# Patient Record
Sex: FEMALE | Race: WHITE | NOT HISPANIC OR LATINO | Employment: FULL TIME | URBAN - METROPOLITAN AREA
[De-identification: names, ages, dates, MRNs, and addresses within clinical notes are randomized per-mention and may not be internally consistent; named-entity substitution may affect disease eponyms.]

---

## 2018-02-02 DIAGNOSIS — Z01.810 PRE-OPERATIVE CARDIOVASCULAR EXAMINATION: ICD-10-CM

## 2018-02-02 DIAGNOSIS — Z01.812 PRE-OPERATIVE LABORATORY EXAMINATION: ICD-10-CM

## 2018-02-02 LAB
ANION GAP SERPL CALC-SCNC: 10 MMOL/L (ref 0–11.9)
BUN SERPL-MCNC: 12 MG/DL (ref 8–22)
CALCIUM SERPL-MCNC: 9.8 MG/DL (ref 8.5–10.5)
CHLORIDE SERPL-SCNC: 104 MMOL/L (ref 96–112)
CO2 SERPL-SCNC: 22 MMOL/L (ref 20–33)
CREAT SERPL-MCNC: 0.67 MG/DL (ref 0.5–1.4)
EKG IMPRESSION: NORMAL
ERYTHROCYTE [DISTWIDTH] IN BLOOD BY AUTOMATED COUNT: 39.9 FL (ref 35.9–50)
GLUCOSE SERPL-MCNC: 82 MG/DL (ref 65–99)
HCG SERPL QL: NEGATIVE
HCT VFR BLD AUTO: 43.6 % (ref 37–47)
HGB BLD-MCNC: 14.4 G/DL (ref 12–16)
MCH RBC QN AUTO: 30.6 PG (ref 27–33)
MCHC RBC AUTO-ENTMCNC: 33 G/DL (ref 33.6–35)
MCV RBC AUTO: 92.6 FL (ref 81.4–97.8)
PLATELET # BLD AUTO: 266 K/UL (ref 164–446)
PMV BLD AUTO: 11.5 FL (ref 9–12.9)
POTASSIUM SERPL-SCNC: 3.7 MMOL/L (ref 3.6–5.5)
RBC # BLD AUTO: 4.71 M/UL (ref 4.2–5.4)
SODIUM SERPL-SCNC: 136 MMOL/L (ref 135–145)
WBC # BLD AUTO: 9.5 K/UL (ref 4.8–10.8)

## 2018-02-02 PROCEDURE — 36415 COLL VENOUS BLD VENIPUNCTURE: CPT

## 2018-02-02 PROCEDURE — 93005 ELECTROCARDIOGRAM TRACING: CPT

## 2018-02-02 PROCEDURE — 93010 ELECTROCARDIOGRAM REPORT: CPT | Performed by: INTERNAL MEDICINE

## 2018-02-02 PROCEDURE — 85027 COMPLETE CBC AUTOMATED: CPT

## 2018-02-02 PROCEDURE — 80048 BASIC METABOLIC PNL TOTAL CA: CPT

## 2018-02-02 PROCEDURE — 84703 CHORIONIC GONADOTROPIN ASSAY: CPT

## 2018-02-02 RX ORDER — ACETAMINOPHEN 500 MG
500-1000 TABLET ORAL EVERY 6 HOURS PRN
COMMUNITY

## 2018-02-03 ENCOUNTER — HOSPITAL ENCOUNTER (OUTPATIENT)
Facility: MEDICAL CENTER | Age: 38
End: 2018-02-03
Attending: SPECIALIST | Admitting: SPECIALIST
Payer: COMMERCIAL

## 2018-02-03 VITALS
OXYGEN SATURATION: 95 % | WEIGHT: 205.47 LBS | RESPIRATION RATE: 18 BRPM | SYSTOLIC BLOOD PRESSURE: 107 MMHG | BODY MASS INDEX: 31.14 KG/M2 | HEART RATE: 95 BPM | HEIGHT: 68 IN | TEMPERATURE: 99 F | DIASTOLIC BLOOD PRESSURE: 67 MMHG

## 2018-02-03 DIAGNOSIS — G89.18 POST-OP PAIN: ICD-10-CM

## 2018-02-03 PROBLEM — D25.9: Status: ACTIVE | Noted: 2018-02-03

## 2018-02-03 PROBLEM — R10.2 PELVIC PAIN IN FEMALE: Status: ACTIVE | Noted: 2018-02-03

## 2018-02-03 PROBLEM — N94.6 DYSMENORRHEA: Status: ACTIVE | Noted: 2018-02-03

## 2018-02-03 PROCEDURE — 500002 HCHG ADHESIVE, DERMABOND: Performed by: SPECIALIST

## 2018-02-03 PROCEDURE — 500123 HCHG BOVIE, CONTROL W/BLADE: Performed by: SPECIALIST

## 2018-02-03 PROCEDURE — 160029 HCHG SURGERY MINUTES - 1ST 30 MINS LEVEL 4: Performed by: SPECIALIST

## 2018-02-03 PROCEDURE — 500886 HCHG PACK, LAPAROSCOPY: Performed by: SPECIALIST

## 2018-02-03 PROCEDURE — 700111 HCHG RX REV CODE 636 W/ 250 OVERRIDE (IP)

## 2018-02-03 PROCEDURE — 501445 HCHG STAPLER, SKIN DISP: Performed by: SPECIALIST

## 2018-02-03 PROCEDURE — 88305 TISSUE EXAM BY PATHOLOGIST: CPT

## 2018-02-03 PROCEDURE — A9270 NON-COVERED ITEM OR SERVICE: HCPCS

## 2018-02-03 PROCEDURE — 160046 HCHG PACU - 1ST 60 MINS PHASE II: Performed by: SPECIALIST

## 2018-02-03 PROCEDURE — 700102 HCHG RX REV CODE 250 W/ 637 OVERRIDE(OP)

## 2018-02-03 PROCEDURE — 160041 HCHG SURGERY MINUTES - EA ADDL 1 MIN LEVEL 4: Performed by: SPECIALIST

## 2018-02-03 PROCEDURE — 501399 HCHG SPECIMAN BAG, ENDO CATC: Performed by: SPECIALIST

## 2018-02-03 PROCEDURE — 700101 HCHG RX REV CODE 250

## 2018-02-03 PROCEDURE — 500800 HCHG LAPAROSCOPIC J/L HOOK: Performed by: SPECIALIST

## 2018-02-03 PROCEDURE — 160035 HCHG PACU - 1ST 60 MINS PHASE I: Performed by: SPECIALIST

## 2018-02-03 PROCEDURE — 160025 RECOVERY II MINUTES (STATS): Performed by: SPECIALIST

## 2018-02-03 PROCEDURE — 160048 HCHG OR STATISTICAL LEVEL 1-5: Performed by: SPECIALIST

## 2018-02-03 PROCEDURE — 160002 HCHG RECOVERY MINUTES (STAT): Performed by: SPECIALIST

## 2018-02-03 PROCEDURE — 501579 HCHG TROCAR, STEP 5MM: Performed by: SPECIALIST

## 2018-02-03 PROCEDURE — 501570 HCHG TROCAR, SEPARATOR: Performed by: SPECIALIST

## 2018-02-03 PROCEDURE — 501838 HCHG SUTURE GENERAL: Performed by: SPECIALIST

## 2018-02-03 PROCEDURE — 502240 HCHG MISC OR SUPPLY RC 0272: Performed by: SPECIALIST

## 2018-02-03 PROCEDURE — 500854 HCHG NEEDLE, INSUFFLATION FOR STEP: Performed by: SPECIALIST

## 2018-02-03 PROCEDURE — 501577 HCHG TROCAR, STEP 11MM: Performed by: SPECIALIST

## 2018-02-03 PROCEDURE — 160009 HCHG ANES TIME/MIN: Performed by: SPECIALIST

## 2018-02-03 RX ORDER — MAGNESIUM HYDROXIDE 1200 MG/15ML
LIQUID ORAL
Status: DISCONTINUED | OUTPATIENT
Start: 2018-02-03 | End: 2018-02-03 | Stop reason: HOSPADM

## 2018-02-03 RX ORDER — HYDROCODONE BITARTRATE AND ACETAMINOPHEN 5; 325 MG/1; MG/1
1 TABLET ORAL EVERY 6 HOURS PRN
Status: DISCONTINUED | OUTPATIENT
Start: 2018-02-03 | End: 2018-02-03 | Stop reason: HOSPADM

## 2018-02-03 RX ORDER — OXYCODONE HCL 5 MG/5 ML
SOLUTION, ORAL ORAL
Status: COMPLETED
Start: 2018-02-03 | End: 2018-02-03

## 2018-02-03 RX ORDER — MEPERIDINE HYDROCHLORIDE 25 MG/ML
INJECTION INTRAMUSCULAR; INTRAVENOUS; SUBCUTANEOUS
Status: COMPLETED
Start: 2018-02-03 | End: 2018-02-03

## 2018-02-03 RX ORDER — ONDANSETRON 2 MG/ML
4 INJECTION INTRAMUSCULAR; INTRAVENOUS EVERY 6 HOURS PRN
Status: DISCONTINUED | OUTPATIENT
Start: 2018-02-03 | End: 2018-02-03 | Stop reason: HOSPADM

## 2018-02-03 RX ORDER — SODIUM CHLORIDE, SODIUM LACTATE, POTASSIUM CHLORIDE, CALCIUM CHLORIDE 600; 310; 30; 20 MG/100ML; MG/100ML; MG/100ML; MG/100ML
INJECTION, SOLUTION INTRAVENOUS CONTINUOUS
Status: DISCONTINUED | OUTPATIENT
Start: 2018-02-03 | End: 2018-02-03 | Stop reason: HOSPADM

## 2018-02-03 RX ORDER — HYDROCODONE BITARTRATE AND ACETAMINOPHEN 5; 325 MG/1; MG/1
1 TABLET ORAL EVERY 6 HOURS PRN
Qty: 28 TAB | Refills: 0 | Status: SHIPPED | OUTPATIENT
Start: 2018-02-03 | End: 2018-02-17

## 2018-02-03 RX ORDER — ONDANSETRON 4 MG/1
4 TABLET, ORALLY DISINTEGRATING ORAL EVERY 4 HOURS PRN
Qty: 30 TAB | Refills: 0 | Status: SHIPPED | OUTPATIENT
Start: 2018-02-03 | End: 2021-02-18

## 2018-02-03 RX ORDER — IBUPROFEN 800 MG/1
800 TABLET ORAL EVERY 8 HOURS PRN
Qty: 30 TAB | Refills: 3 | Status: SHIPPED | OUTPATIENT
Start: 2018-02-03

## 2018-02-03 RX ORDER — IBUPROFEN 200 MG
800 TABLET ORAL EVERY 8 HOURS PRN
Status: DISCONTINUED | OUTPATIENT
Start: 2018-02-03 | End: 2018-02-03 | Stop reason: HOSPADM

## 2018-02-03 RX ORDER — LIDOCAINE HYDROCHLORIDE 10 MG/ML
INJECTION, SOLUTION INFILTRATION; PERINEURAL
Status: COMPLETED
Start: 2018-02-03 | End: 2018-02-03

## 2018-02-03 RX ADMIN — MEPERIDINE HYDROCHLORIDE 25 MG: 25 INJECTION INTRAMUSCULAR; INTRAVENOUS; SUBCUTANEOUS at 09:35

## 2018-02-03 RX ADMIN — LIDOCAINE HYDROCHLORIDE 0.5 ML: 10 INJECTION, SOLUTION INFILTRATION; PERINEURAL at 06:34

## 2018-02-03 RX ADMIN — OXYCODONE HYDROCHLORIDE 10 MG: 5 SOLUTION ORAL at 09:35

## 2018-02-03 ASSESSMENT — PAIN SCALES - GENERAL
PAINLEVEL_OUTOF10: 0
PAINLEVEL_OUTOF10: 6
PAINLEVEL_OUTOF10: 6
PAINLEVEL_OUTOF10: 3
PAINLEVEL_OUTOF10: 7

## 2018-02-03 NOTE — OP REPORT
DATE OF SERVICE:  02/03/2018    PREOPERATIVE DIAGNOSES:  1.  Pelvic pain.  2.  Menorrhagia.  3.  Dysmenorrhea.  4.  Posterior uterine fibroid.    POSTOPERATIVE DIAGNOSES:  1.  Pelvic pain.  2.  Menorrhagia.  3.  Dysmenorrhea.  4.  Posterior uterine fibroid.  5.  There are extensive adhesions of the omentum to the anterior abdominal   wall underneath the umbilicus and also to the anterior abdominal wall in the   right lower quadrant.  6.  There are also adhesions involving both ovaries, including adhesions of   the left ovary to the left posterior aspect of the uterus as well as other   adhesions of the left ovary to surrounding tissues and there are also   adhesions of the right ovary to the right posterior aspect of the uterus and   also adhesions of the right ovary to the right ovarian fossa.    PROCEDURES:  1.  Laparoscopy with lysis of adhesions of the omentum to the anterior   abdominal wall.  2.  Laparoscopic of bilateral periovarian adhesions.  3.  Myomectomy (removal of posterior uterine fibroid).    SURGEON:  Marek Daugherty MD    ANESTHESIA:  General endotracheal tube anesthesia.    ANESTHESIOLOGIST:  Mateo Calvin MD    FINDINGS:  1.  Speculum exam under anesthesia reveals that there are no vulvar, vaginal   or cervical lesions.  The cervix was well visualized and found to be   nulliparous.  2.  At laparoscopy, extensive adhesions of the omentum to the anterior   abdominal wall are encountered and also, there are adhesions of the omentum to   the anterior abdominal wall in the right lower quadrant as well as adhesions   to the anterior abdominal wall underneath the umbilicus.  3.  There are bilateral adnexal (ovarian) adhesions.  These include adhesions   of the left ovary to the left posterior aspect of the uterus and also   adhesions of the left ovary to surrounding tissues/structures.  4.  There are adhesions involving the right ovary including adhesions of the   right ovary to the right posterior  aspect of the uterus and also adhesions of   the right ovary to the right ovarian fossa.  There is a pedunculated   subserosal midline posterior uterine fibroid.  5.  Both fallopian tubes are absent.  6.  At the end of the procedure, hemostasis was noted to be adequate.    SPECIMENS:  Posterior uterine fibroid.    COMPLICATIONS:  None.    ESTIMATED BLOOD LOSS:  Less than 50 mL    DESCRIPTION OF PROCEDURE:  After the appropriate consents have been obtained,   the patient was taken to the operating room and given general anesthesia.  She   is prepped and draped in the dorsal lithotomy position and a Wakefield catheter   is noted to be in place and draining urine.  A speculum exam is performed and   reveals that there are no vulvar, vaginal or cervical lesions.  The cervix is   well visualized and is nulliparous.  The anterior aspect of the cervix is   grasped with a single tooth tenaculum.  The cervix is dilated with Hanks   dilators.  The uterus is sounded to 7 cm.  A 6 cm Ana uterine manipulator is   advanced through the endocervical canal into the intrauterine cavity and the   balloon at the tip of the Ana uterine manipulator is inflated with 5 mL of   air.  The single tooth tenaculum was removed from the cervix.  A gauze sponge   was placed in the vaginal vault posterior to the Ana uterine manipulator and   left in place as the speculum was removed.  's gloves were then   changed.  Attention was then directed to the abdomen where a small   (approximately 1 cm) vertical infraumbilical incision made with a scalpel.  A   Veress needle was advanced through this incision into the peritoneal cavity   and proper placement in the peritoneal cavity is verified with a Diamond   hanging drop technique.  The peritoneal cavity is then insufflated with   approximately 2-3 liters of carbon dioxide gas.  The Veress needle was removed   and a 5 mm port was introduced through the infraumbilical incision into the   peritoneal  cavity utilizing the VersaStep trocar system.  The central portion   of this port was removed and a 5 mm 0-degree laparoscope was inserted through   the remaining sleeve and proper entry in the peritoneal cavity was verified   visually with laparoscope.  The patient was placed in Trendelenburg position.    A 5 mm port was placed suprapubically under direct laparoscopic   visualization, utilizing the VersaStep trocar system.  The laparoscope was   placed through the suprapubic port and used to visualize the upper abdomen   including the infraumbilical port and when this was done, extensive adhesions   of the omentum to the anterior abdominal wall underneath the umbilicus are   seen.  A 5 mm port was placed in the left lower quadrant under direct   laparoscopic visualization utilizing the VersaStep trocar system and the   suprapubic port was converted to a 10-12 mm port again utilizing the VersaStep   trocar system.  The laparoscope was placed not only through the   infraumbilical port, but at times through the 2 other accessory ports.  Also,   the 5 mm 0-degree laparoscope was exchanged for the 5 mm 30-degree   laparoscope.  The laparoscope was placed through the accessory ports as is the   Harmonic scalpel in order to lyse adhesions of the omentum to the anterior   abdominal wall.  This takes considerable time, but once accomplished, the   omentum was thoroughly dissected away from the anterior abdominal wall.  The   laparoscope was replaced through the infraumbilical port.  The uterus is   mobilized with the Ana uterine manipulator and findings are as noted above.    Adhesiolysis is carried out involving both ovaries with the Harmonic scalpel   in the usual fashion.  The posterior uterine fibroids are resected with the   Harmonic scalpel in the usual fashion.  The EndoCatch bag was placed through   the suprapubic port and opened in the peritoneal cavity and resected.    Posterior fibroid was placed in the  EndoCatch bag and the mouth of the   EndoCatch bag was closed over this and the mouth of the EndoCatch bag along   with suprapubic port were delivered through the suprapubic incision.  The   suprapubic skin incision was extended bilaterally with the scalpel and layers   underneath the suprapubic incision were dissected out and extended slightly   and the bag was removed with the fibroid.  A 15 mm port was placed   suprapubically.  The pelvis was copiously irrigated and drained.  There is   some bleeding seen from inferior aspect of the right ovary from where it had   been adherent to the right ovarian fossa and the spleen was cauterized with   monopolar cautery instrument, namely the J hook.  The pelvis was copiously   irrigated and drained with the suction irrigation instrument.  Hemostasis is   noted to be excellent.  At times, a gauze sponge was placed in the peritoneal   cavity and then removed.  At this time, lap and needle counts reported to be   correct.  1.5 L of ADEPT solution (adhesion prevention solution) placed in the   peritoneal cavity, so as to discourage reformation of adhesions.  The   laparoscope was removed and air is allowed to evacuate the peritoneal cavity.    All ports were removed.  The fascia underneath the suprapubic incision is   identified with the use of S retractors and reapproximated with placement of   simple interrupted suture using Vicryl.  Skin incision reapproximated with   interrupted buried sutures of 4-0 Monocryl and Vicryl.  The Ana uterine   manipulator and vaginal gauze sponge were removed.  The procedure was   terminated.  Final lap and needle counts reported to be correct x2 at the end   of the procedure.  The patient tolerated the procedure well and sent to   postanesthesia recovery in stable condition.       ____________________________________     MD THA MARCOS / N[facility]    DD:  02/03/2018 10:07:00  DT:  02/03/2018 10:39:05    D#:  [id number]  Job#:   [id number]    cc: CONG YUNG MD

## 2018-02-03 NOTE — OR SURGEON
Immediate Post OP Note    PreOp Diagnosis:   Pelvic pain  Menorrhagia  Dysmenorrhea  Posterior uterine fibroid.    PostOp Diagnosis:   Pelvic pain  Menorrhagia  Dysmenorrhea  Posterior uterine fibroid.  There are extensive adhesions of the omentum to the anterior abdominal wall underneath the umbilicus and also to the anterior wall in the right lower quadrant of the abdomen.  There are also adhesions involving both ovaries, including adhesion of the left ovary to the left posterior aspect of the uterus as well as other adhesions of the left ovary to surrounding tissues and also there are adhesions of the right ovary to the right posterior aspect of the uterus and also to the right ovarian fossa.    Procedure(s):  PELVISCOPY - LYSIS OF ADHESIONS - Wound Class: Clean  MYOMECTOMY - Wound Class: Clean    Surgeon(s):  Marek Daugherty M.D.    Anesthesiologist/Type of Anesthesia:  Anesthesiologist: Mateo Calvin M.D./General endotracheal tube anesthesia    Surgical Staff:  Circulator: Sheri Shrestha R.N.; Claudia Muniz RJULIAN; Alexis Castañeda RJudyNJudy  Scrub Person: Pop Milligangins    Specimens:  Posterior uterine fibroid    Estimated Blood Loss:  Less than 50 mL    Findings:  Speculum exam under anesthesia reveals that there are no vulvar or vaginal or cervical lesions. Cervix well visualized and nulliparous.  At laparoscopy extensive adhesions of the omentum to the anterior abdominal wall and also to the abdominal wall in the right lower quadrant are noted. These are lysed.  Also there are bilateral adnexal (ovarian) adhesions. These included adhesions of the left ovary to the left posterior aspect of the uterus and also adhesions of the left ovary to surrounding tissue/structures.  There are adhesions involving the right ovary including adhesions of the right ovary to the right posterior aspect of the uterus and adhesions of the right ovary to the right ovarian fossa.  There is a pedunculated subserosal midline  posterior uterine fibroid.  Of note both fallopian tubes are absent.  At the end of the procedure hemostasis is noted to be adequate.    Complications:  None    Of note 3 small laparoscopic incisions were made. These include a small infraumbilical vertical incision, a somewhat larger (approximately 2 cm) horizontal suprapubic incision, and a small (proxy 1 cm) left lower quadrant incision.        2/3/2018 9:39 AM Marek Daugherty

## 2018-02-03 NOTE — H&P
Mira Diaz          YOB: 1980  Date of today's surgery: Saturday, February 3, 2018  Facility: [facility]    ID: The patient is a very pleasant 37-year-old nullipara.    Chief Complaint: The patient complains of pelvic pain, including dysmenorrhea, which is accompanied by menorrhagia.    History of Present Illness: The patient has had complaints of pelvic pain and menorrhagia and dysmenorrhea and has been diagnosed as having a uterine fibroid.  On August 29, 2016 I performed a transvaginal pelvic ultrasound (indication: pelvic pain and dysmenorrhea) which did reveal a posterior subserosal uterine fibroids measuring 22.4 millimeters by 21.5 millimeters by 20.8 millimeters and revealed that the uterus measures 5.47 centimeters longitudinally, not including the cervix, by 4.31 centimeters in AP dimension, by 5.08 centimeters in transverse dimension and the endometrial stripe was smooth and regular and measured 9.66 millimeters in thickness and both ovaries were identified and were normal.  Her recent Pap smear from September 13, 2017 was normal/negative.  She is scheduled today for a laparoscopy, both diagnostic and if necessary operative with possible laparoscopic myomectomy.  I have discussed with the patient in detail and at length what diagnostic and operative laparoscopy, including laparoscopic myomectomy, is, and what diagnostic and operative laparoscopy, along with laparoscopic myomectomy, involves, and I discussed with her and explained to her in detail and at length the risks and benefits and alternatives of diagnostic and operative laparoscopy with laparoscopic myomectomy.  After our discussions and after answering her question she said that she would like for us to proceed with laparoscopy, both diagnostic and if necessary and depending on findings at the time of laparoscopy, operative, with possible laparoscopic myomectomy.    Past Medical History: The patient  says that she has a history of migraine headaches and low back pain and asthma.    Past Surgical History: The patient says that she has had an appendectomy and tonsillectomy and salpingectomy.  It was on April 18, 2015, almost 3 years ago, that I performed diagnostic and operative laparoscopy with extensive laparoscopic lysis of adhesions and laparoscopic removal of bilateral hydrosalpinx via laparoscopic bilateral salpingectomies.    Medications: The patient currently takes no medications.    Allergies: The patient says that she is allergic to penicillin.    Social History: The patient denies smoking.  She only really consumes alcoholic beverages.  She denies the use of recreational drugs.    Review of Systems  General: The patient denies any fevers, chills, sweats.  She does have some fatigue.  Pulmonary: The patient denies any coughing, wheezing, chest pain, shortness of breath.  Cardiovascular: The patient denies any palpitations, dyspnea, chest pain.  Gastrointestinal: The patient denies any nausea except when she has nausea with pelvic pain, and she says that her pelvic pain is accompanied by nausea), vomiting, hematochezia, melena, history of hepatitis, history of jaundice.  She says she really has constipation and that she really has diarrhea.  Genitourinary: The patient complains of menorrhagia and dysmenorrhea and pelvic pain.  Musculoskeletal: The patient denies any arthralgias or myalgias.   Neurological: No headaches or syncope or seizures.     Physical Exam:   Vital Signs: The patient's vital signs are stable and she is afebrile.  General: The patient appears well developed and well nourished and relaxed and alert and comfortable and in no apparent distress.    HEENT :  Normo-cephalic, atraumatic, pupils equal, round, reactive to light and accommodation, extra ocular motions intact, pharynx clear; there is no thyromegaly. There is no cervical lymphadenopathy.  Chest: Heart regular rate and rhythm, with  no murmurs or rubs or gallops; the lungs are clear to auscultation bilaterally.  Abdomen: The abdomen is soft and flat and non-tender and non-distended. There is no hepatomegaly. There is no splenomegaly.   Pelvic: Bimanual exam reveals that there is diffuse mild nonspecific pelvic tenderness and reveals no evidence of uterine enlargement and no evidence of any adnexal masses either on the right or the left.  Extremities: No clubbing or cyanosis or edema.   Neurological: non-focal.     Assessment:   Pelvic pain  Menorrhagia  Dysmenorrhea  Posterior uterine fibroid.    Plan:   We will proceed today with laparoscopy, both diagnostic and if necessary and depending on findings at the time of laparoscopy, operative, with possible laparoscopic myomectomy.  Please see above.            ________________________  Marek Daugherty M.D.

## 2018-02-03 NOTE — DISCHARGE INSTRUCTIONS
ACTIVITY: Rest and take it easy for the first 24 hours.  A responsible adult is recommended to remain with you during that time.  It is normal to feel sleepy.  We encourage you to not do anything that requires balance, judgment or coordination.    MILD FLU-LIKE SYMPTOMS ARE NORMAL. YOU MAY EXPERIENCE GENERALIZED MUSCLE ACHES, THROAT IRRITATION, HEADACHE AND/OR SOME NAUSEA.    FOR 24 HOURS DO NOT:  Drive, operate machinery or run household appliances.  Drink beer or alcoholic beverages.   Make important decisions or sign legal documents.    SPECIAL INSTRUCTIONS: *Follow up with Dr Daugherty in about 2 weeks  Follow Dr Daugherty instructions   Nothing in your vagina until Okayed by Dr Daugherty**    DIET: To avoid nausea, slowly advance diet as tolerated, avoiding spicy or greasy foods for the first day.  Add more substantial food to your diet according to your physician's instructions.  Babies can be fed formula or breast milk as soon as they are hungry.  INCREASE FLUIDS AND FIBER TO AVOID CONSTIPATION.    SURGICAL DRESSING/BATHING: *Ok to Shower, no lotions or creams on sites. Keep Clean and dry. *    FOLLOW-UP APPOINTMENT:  A follow-up appointment should be arranged with your doctor in *2 Weeks**; call to schedule.    You should CALL YOUR PHYSICIAN if you develop:  Fever greater than 101 degrees F.  Pain not relieved by medication, or persistent nausea or vomiting.  Excessive bleeding (blood soaking through dressing) or unexpected drainage from the wound.  Extreme redness or swelling around the incision site, drainage of pus or foul smelling drainage.  Inability to urinate or empty your bladder within 8 hours.  Problems with breathing or chest pain.    You should call 911 if you develop problems with breathing or chest pain.  If you are unable to contact your doctor or surgical center, you should go to the nearest emergency room or urgent care center.  Physician's telephone #: *[phone]**    If any questions arise,  call your doctor.  If your doctor is not available, please feel free to call the Surgical Center at [phone].  The Center is open Monday through Friday from 7AM to 7PM.  You can also call the HEALTH HOTLINE open 24 hours/day, 7 days/week and speak to a nurse at [phone], or toll free at [phone].    A registered nurse may call you a few days after your surgery to see how you are doing after your procedure.    MEDICATIONS: Resume taking daily medication.  Take prescribed pain medication with food.  If no medication is prescribed, you may take non-aspirin pain medication if needed.  PAIN MEDICATION CAN BE VERY CONSTIPATING.  Take a stool softener or laxative such as senokot, pericolace, or milk of magnesia if needed.    Prescription given for *Norco and Motrin**.  Last pain medication given at *9:35 am **.    If your physician has prescribed pain medication that includes Acetaminophen (Tylenol), do not take additional Acetaminophen (Tylenol) while taking the prescribed medication.    Depression / Suicide Risk    As you are discharged from this [facility] facility, it is important to learn how to keep safe from harming yourself.    Recognize the warning signs:  · Abrupt changes in personality, positive or negative- including increase in energy   · Giving away possessions  · Change in eating patterns- significant weight changes-  positive or negative  · Change in sleeping patterns- unable to sleep or sleeping all the time   · Unwillingness or inability to communicate  · Depression  · Unusual sadness, discouragement and loneliness  · Talk of wanting to die  · Neglect of personal appearance   · Rebelliousness- reckless behavior  · Withdrawal from people/activities they love  · Confusion- inability to concentrate     If you or a loved one observes any of these behaviors or has concerns about self-harm, here's what you can do:  · Talk about it- your feelings and reasons for harming yourself  · Remove any  means that you might use to hurt yourself (examples: pills, rope, extension cords, firearm)  · Get professional help from the community (Mental Health, Substance Abuse, psychological counseling)  · Do not be alone:Call your Safe Contact- someone whom you trust who will be there for you.  · Call your local CRISIS HOTLINE 140-5439 or 646-354-1634  · Call your local Children's Mobile Crisis Response Team [place] (303) 210-3493 or www.[employer]  · Call the toll free National Suicide Prevention Hotlines   · National Suicide Prevention Lifeline 768-932-GTGL (0965)  · National Hope Line Network 800-SUICIDE (069-9439)

## 2018-12-31 ENCOUNTER — HOSPITAL ENCOUNTER (OUTPATIENT)
Facility: MEDICAL CENTER | Age: 38
End: 2018-12-31
Attending: NURSE PRACTITIONER
Payer: COMMERCIAL

## 2018-12-31 ENCOUNTER — OFFICE VISIT (OUTPATIENT)
Dept: URGENT CARE | Facility: MEDICAL CENTER | Age: 38
End: 2018-12-31
Payer: COMMERCIAL

## 2018-12-31 VITALS
DIASTOLIC BLOOD PRESSURE: 68 MMHG | HEART RATE: 74 BPM | BODY MASS INDEX: 31.07 KG/M2 | OXYGEN SATURATION: 100 % | WEIGHT: 205 LBS | HEIGHT: 68 IN | TEMPERATURE: 98 F | SYSTOLIC BLOOD PRESSURE: 108 MMHG

## 2018-12-31 DIAGNOSIS — Z87.19 HISTORY OF DIVERTICULITIS: Primary | ICD-10-CM

## 2018-12-31 DIAGNOSIS — R10.32 LEFT LOWER QUADRANT PAIN: ICD-10-CM

## 2018-12-31 DIAGNOSIS — Z20.818 EXPOSURE TO MRSA: ICD-10-CM

## 2018-12-31 PROCEDURE — 99214 OFFICE O/P EST MOD 30 MIN: CPT | Performed by: NURSE PRACTITIONER

## 2018-12-31 PROCEDURE — 87641 MR-STAPH DNA AMP PROBE: CPT

## 2018-12-31 RX ORDER — AMOXICILLIN AND CLAVULANATE POTASSIUM 875; 125 MG/1; MG/1
1 TABLET, FILM COATED ORAL 3 TIMES DAILY
Qty: 30 TAB | Refills: 0 | Status: SHIPPED | OUTPATIENT
Start: 2018-12-31 | End: 2019-01-10

## 2019-01-01 LAB
MRSA DNA SPEC QL NAA+PROBE: NORMAL
SIGNIFICANT IND 70042: NORMAL
SITE SITE: NORMAL
SOURCE SOURCE: NORMAL

## 2019-01-01 ASSESSMENT — ENCOUNTER SYMPTOMS
WEAKNESS: 0
CONSTITUTIONAL NEGATIVE: 1
ABDOMINAL PAIN: 1
NEUROLOGICAL NEGATIVE: 1
FEVER: 0
EYES NEGATIVE: 1
CONSTIPATION: 0
RESPIRATORY NEGATIVE: 1
FOCAL WEAKNESS: 0
DIZZINESS: 0
SHORTNESS OF BREATH: 0
PSYCHIATRIC NEGATIVE: 1
DIARRHEA: 0
TINGLING: 0
MUSCULOSKELETAL NEGATIVE: 1
SENSORY CHANGE: 0
ANOREXIA: 0
VOMITING: 0
NAUSEA: 0
CARDIOVASCULAR NEGATIVE: 1
BLOOD IN STOOL: 0

## 2019-01-01 NOTE — PROGRESS NOTES
Subjective:     Mira Diaz is a 38 y.o. female who presents for Diverticulitis (diverticulitis flare up, bad pain in left side, tender to the touch, started last night)       LLQ Pain   This is a new problem. The current episode started yesterday. The pain is located in the LLQ. The pain is moderate. The quality of the pain is sharp. The abdominal pain does not radiate. Pertinent negatives include no anorexia, constipation, diarrhea, dysuria, fever, nausea or vomiting. The pain is aggravated by palpation. Hx of diverticulitis.     PMH:  has a past medical history of Arrhythmia; Asthma; Breath shortness; Gynecological disorder; Heart burn; cold sores; Infectious disease; Jaundice (1982); Pain; and Psychiatric disorder.    MEDS:   Current Outpatient Prescriptions:   •  amoxicillin-clavulanate (AUGMENTIN) 875-125 MG Tab, Take 1 Tab by mouth 3 times a day for 10 days., Disp: 30 Tab, Rfl: 0  •  omeprazole (PRILOSEC) 20 MG delayed-release capsule, Take 20 mg by mouth every day., Disp: , Rfl:   •  acyclovir (ZOVIRAX) 200 MG CAPS, Take 800 mg by mouth 2 times a day., Disp: , Rfl:   •  ibuprofen (MOTRIN) 800 MG Tab, Take 1 Tab by mouth every 8 hours as needed for Mild Pain ((1-3 Pain Scale) or fever over 101.5 or if allergic to acetaminophen)., Disp: 30 Tab, Rfl: 3  •  ondansetron (ZOFRAN ODT) 4 MG TABLET DISPERSIBLE, Take 1 Tab by mouth every four hours as needed for Nausea., Disp: 30 Tab, Rfl: 0  •  acetaminophen (TYLENOL) 500 MG Tab, Take 500-1,000 mg by mouth every 6 hours as needed., Disp: , Rfl:   •  NON SPECIFIED, Kratom tea extract for pain anxiety, Disp: , Rfl:   •  alprazolam (XANAX) 0.25 MG TABS, Take 0.25 mg by mouth at bedtime as needed., Disp: , Rfl:     ALLERGIES:   Allergies   Allergen Reactions   • Tape      SURGHX:   Past Surgical History:   Procedure Laterality Date   • PELVISCOPY  2/3/2018    Procedure: PELVISCOPY - LYSIS OF ADHESIONS;  Surgeon: Marek Daugherty M.D.;  Location: SURGERY [facility]  "[facility];  Service: Gynecology   • MYOMECTOMY  2/3/2018    Procedure: MYOMECTOMY;  Surgeon: Marek Daugherty M.D.;  Location: SURGERY [facility];  Service: Gynecology   • LAPAROSCOPY  4/18/2015    Performed by Marek Daugherty M.D. at SURGERY [facility]   • TONSILLECTOMY  1986   • APPENDECTOMY     • TUBAL LIGATION       SOCHX:  reports that she quit smoking about 2 years ago. Her smoking use included Cigarettes. She has never used smokeless tobacco. She reports that she drinks alcohol. She reports that she uses drugs, including Oral.    FH: Reviewed with patient, not pertinent to this visit.     Review of Systems   Constitutional: Negative.  Negative for fever and malaise/fatigue.   HENT: Negative.    Eyes: Negative.    Respiratory: Negative.  Negative for shortness of breath.    Cardiovascular: Negative.  Negative for chest pain.   Gastrointestinal: Positive for abdominal pain (LLQ). Negative for anorexia, blood in stool, constipation, diarrhea, nausea and vomiting.   Genitourinary: Negative.  Negative for dysuria.   Musculoskeletal: Negative.    Skin: Negative.    Neurological: Negative.  Negative for dizziness, tingling, sensory change, focal weakness and weakness.   Psychiatric/Behavioral: Negative.    All other systems reviewed and are negative.    Objective:     /68 (BP Location: Left arm, Patient Position: Sitting, BP Cuff Size: Adult)   Pulse 74   Temp 36.7 °C (98 °F) (Temporal)   Ht 1.727 m (5' 8\")   Wt 93 kg (205 lb)   SpO2 100%   BMI 31.17 kg/m²     Physical Exam   Constitutional: She is oriented to person, place, and time. Vital signs are normal. She appears well-developed and well-nourished. She is cooperative.  Non-toxic appearance. She does not appear ill. No distress.   HENT:   Head: Normocephalic.   Right Ear: External ear normal.   Left Ear: External ear normal.   Nose: Nose normal.   Eyes: Conjunctivae and EOM are normal.   Neck: Normal range of motion.   Cardiovascular: " Normal rate, regular rhythm, normal heart sounds and normal pulses.    Pulmonary/Chest: Effort normal and breath sounds normal. No respiratory distress.   Abdominal: Soft. Normal appearance and bowel sounds are normal. She exhibits no distension. There is tenderness in the left lower quadrant. There is no rebound.   Musculoskeletal: Normal range of motion. She exhibits no deformity.   Neurological: She is alert and oriented to person, place, and time. She has normal strength. No sensory deficit.   Skin: Skin is warm and dry. Capillary refill takes less than 2 seconds.   Psychiatric: She has a normal mood and affect.   Vitals reviewed.       Assessment/Plan:     1. History of diverticulitis  - amoxicillin-clavulanate (AUGMENTIN) 875-125 MG Tab; Take 1 Tab by mouth 3 times a day for 10 days.  Dispense: 30 Tab; Refill: 0    2. Left lower quadrant pain  - amoxicillin-clavulanate (AUGMENTIN) 875-125 MG Tab; Take 1 Tab by mouth 3 times a day for 10 days.  Dispense: 30 Tab; Refill: 0    3. Exposure to MRSA  - MRSA SURVEILLANCE; Future    Pt states she has a GI specialist has previously placed her on Bactrim DS after patient experienced intolerance to ciprofloxacin + metronidazole. Unclear if patient is intolerant to metronidazole alone. Will send Rx for Augmentin. Outpatient CT abdomen not feasible at this time as it is late in the day and a holiday occurs tomorrow. Pt appears non-toxic, is tolerating PO, VS stable so will hold off on immediately referring to ER at this time but advised strict ER precautions with patient who agrees to seek immediate care if symptoms worsen including worsening abdominal pain, fever, etc. Pt states she will follow up with her GI.    On a side note, pt states her son has been in the hospital recently and diagnosed with MRSA. Pt requesting MRSA swab of her nose to determine if she is a carrier or not.    Patient advised to: Return for 1) Symptoms that change or worsen, or go to the ER, 2)  Follow up with primary care in 7-10 days.    Differential diagnosis, natural history, supportive care, and indications for immediate follow-up discussed. All questions answered. Patient agrees with the plan of care.

## 2020-04-15 ENCOUNTER — PATIENT MESSAGE (OUTPATIENT)
Dept: PEDIATRICS | Facility: MEDICAL CENTER | Age: 40
End: 2020-04-15

## 2020-04-16 NOTE — TELEPHONE ENCOUNTER
From: Mira Diaz  To: NARGIS Martinez  Sent: 4/15/2020 4:33 PM PDT  Subject: Non-Urgent Medical Question    Hi Bhavana,    I think that this is psoriasis, there is also a patch developing on his back :(. We have limited baths, included oatmeal when he does bathe, scent free soap and lotion and I have even tried putting vaseline on it. It first showed up about a month ago as a small rough patch and has just continued to grow. Help.

## 2021-01-11 ENCOUNTER — TELEPHONE (OUTPATIENT)
Dept: SCHEDULING | Facility: IMAGING CENTER | Age: 41
End: 2021-01-11

## 2021-02-18 ENCOUNTER — TELEMEDICINE (OUTPATIENT)
Dept: MEDICAL GROUP | Facility: PHYSICIAN GROUP | Age: 41
End: 2021-02-18
Payer: COMMERCIAL

## 2021-02-18 ENCOUNTER — APPOINTMENT (OUTPATIENT)
Dept: MEDICAL GROUP | Facility: PHYSICIAN GROUP | Age: 41
End: 2021-02-18
Payer: COMMERCIAL

## 2021-02-18 VITALS
HEART RATE: 94 BPM | OXYGEN SATURATION: 94 % | BODY MASS INDEX: 29.7 KG/M2 | WEIGHT: 196 LBS | HEIGHT: 68 IN | TEMPERATURE: 97.5 F

## 2021-02-18 DIAGNOSIS — B00.9 HSV (HERPES SIMPLEX VIRUS) INFECTION: ICD-10-CM

## 2021-02-18 DIAGNOSIS — R82.90 CLOUDY URINE: ICD-10-CM

## 2021-02-18 DIAGNOSIS — R35.0 URINARY FREQUENCY: ICD-10-CM

## 2021-02-18 DIAGNOSIS — Z13.21 ENCOUNTER FOR VITAMIN DEFICIENCY SCREENING: ICD-10-CM

## 2021-02-18 DIAGNOSIS — M25.562 ACUTE PAIN OF LEFT KNEE: ICD-10-CM

## 2021-02-18 DIAGNOSIS — E65 CENTRAL OBESITY: ICD-10-CM

## 2021-02-18 DIAGNOSIS — E66.3 OVERWEIGHT (BMI 25.0-29.9): ICD-10-CM

## 2021-02-18 DIAGNOSIS — Z00.00 WELLNESS EXAMINATION: ICD-10-CM

## 2021-02-18 PROCEDURE — 99214 OFFICE O/P EST MOD 30 MIN: CPT | Performed by: PHYSICIAN ASSISTANT

## 2021-02-18 RX ORDER — ACYCLOVIR 800 MG/1
800 TABLET ORAL
COMMUNITY
Start: 2020-12-29

## 2021-02-18 NOTE — PROGRESS NOTES
Virtual Visit: Established Patient   This visit was conducted via Zoom using secure and encrypted videoconferencing technology. The patient was in a private location in the state of [place].    The patient's identity was confirmed and verbal consent was obtained for this virtual visit.    Subjective:   CC: diabetes concerns and left knee pain for 1.5 months.   Chief Complaint   Patient presents with   • Establish Care   • Other     Might have symptoms of diabetes   • Other     Left knee hurting for a month in a half       Mira Diaz is a 41 y.o. female presenting for evaluation and management of:      Urinary frequency  Cloudy urine  Central obesity  Is concerned about diabetes.  She tells me that she has and has always carry her weight in her midsection.  States she variances urinary frequency and her urine cloudy.  1 cup of coffee per day.  States she drinks water throughout her day.   She tells me that her menstrual cycles are regular and her menses are 3 days in duration and positive for heavy bleeding.  She denies excessive carbohydrates or sweets.  States she does not overeat.  States recently she started doing yoga daily and has been able to lose 10 LBS.  Denies polydipsia, polyphagia, poor wound healing, vision changes, peripheral neuropathy.       Acute pain of left knee  Patient states for the past 1.5 months her left knee has been constantly hurting.  Describes pain as a low-grade nonradiating aching pain and pain symptoms worsen with activity.  She tells me that she is aware that she is overweight and needs to work on normalizing body weight.  She feels like left knee will give out on her sometimes.  States it feels like she is lacking cushioning in her left knee.  Pain is located over her entire knee but predominantly over her patellar tendon region.  Denies tenderness with palpation.  Denies swelling.  Denies muscle atrophy or weakness.  She uses over-the-counter ibuprofen daily with some  improvement of pain symptoms.    Overweight (BMI 25.0-29.9)  See above.       HSV (herpes simplex virus) infection  Chronic but stable problem.  Patient states she has HSV-1 and HSV-2.  She gets recurrent cold sores and has been diagnosed with genital herpes.  States she takes acyclovir 800 mg once daily.  Denies side effects or complications of medication.  This regimen works well for her.      ROS   Denies any recent fevers or chills. No nausea or vomiting. No chest pains or shortness of breath.     Allergies   Allergen Reactions   • Tape        Current medicines (including changes today)  Current Outpatient Medications   Medication Sig Dispense Refill   • acyclovir (ZOVIRAX) 800 MG Tab Take 800 mg by mouth every day.     • ibuprofen (MOTRIN) 800 MG Tab Take 1 Tab by mouth every 8 hours as needed for Mild Pain ((1-3 Pain Scale) or fever over 101.5 or if allergic to acetaminophen). 30 Tab 3   • acetaminophen (TYLENOL) 500 MG Tab Take 500-1,000 mg by mouth every 6 hours as needed.     • NON SPECIFIED Kratom tea extract for pain anxiety     • omeprazole (PRILOSEC) 20 MG delayed-release capsule Take 20 mg by mouth every day.       No current facility-administered medications for this visit.       Patient Active Problem List    Diagnosis Date Noted   • Overweight (BMI 25.0-29.9) 02/18/2021   • Acute pain of left knee 02/18/2021   • Pelvic pain in female 02/03/2018   • Dysmenorrhea 02/03/2018   • Serosal uterine leiomyoma 02/03/2018       Family History   Problem Relation Age of Onset   • No Known Problems Son        She  has a past medical history of Arrhythmia, Asthma, Breath shortness, Gynecological disorder, Heart burn, cold sores, Infectious disease, Jaundice (1982), Pain, and Psychiatric disorder.  She  has a past surgical history that includes laparoscopy (4/18/2015); tubal ligation; appendectomy; tonsillectomy (1986); pelviscopy (2/3/2018); and myomectomy (2/3/2018).       Objective:   Pulse 94 Comment: pt  "stated  Temp 36.4 °C (97.5 °F) (Temporal) Comment: pt stated  Ht 1.727 m (5' 8\") Comment: pt stated  Wt 88.9 kg (196 lb) Comment: pt stated  SpO2 94% Comment: pt stated  BMI 29.80 kg/m²     Physical Exam:  Constitutional: Alert, no distress, well-groomed.  Skin: No rashes in visible areas.  Eye: Round. Conjunctiva clear, lids normal. No icterus.   ENMT: Lips pink without lesions, good dentition, moist mucous membranes. Phonation normal.  Neck: No masses, no thyromegaly. Moves freely without pain.  Respiratory: Unlabored respiratory effort, no cough or audible wheeze  Psych: Alert and oriented x3, normal affect and mood.       Assessment and Plan:   The following treatment plan was discussed:     1. Urinary frequency  2. Cloudy urine  Lab work has been ordered.  Patient follow-up in 1 month to discuss lab work results.  Continue working on diet exercise.  Continue to monitor.    - Comp Metabolic Panel; Future  - CBC WITH DIFFERENTIAL; Future  - HEMOGLOBIN A1C; Future  - URINALYSIS,CULTURE IF INDICATED; Future      3. Overweight (BMI 25.0-29.9)  Lab work has been ordered.  Patient follow-up in 1 month to discuss lab work results.  Continue work on diet and exercise.  Continue normalizing body weight.  Patient has been referred to nutritionist.    - Comp Metabolic Panel; Future  - CBC WITH DIFFERENTIAL; Future  - Lipid Profile; Future  - TSH WITH REFLEX TO FT4; Future  - REFERRAL TO NUTRITION SERVICES    4. Central obesity  Same as # 3.     - Comp Metabolic Panel; Future  - CBC WITH DIFFERENTIAL; Future  - Lipid Profile; Future  - TSH WITH REFLEX TO FT4; Future  - HEMOGLOBIN A1C; Future  - REFERRAL TO NUTRITION SERVICES    5. Acute pain of left knee  Acute.  Vies patient to continue work on diet and exercise.  Continue normalizing body weight.  Continue yoga and stretching.  Use ice and heat as needed.  Continue using over-the-counter anti-inflammatories as needed.  X-ray has been ordered.  We will discuss imaging " results during follow-up appointment in 1 month.    - DX-KNEE 3 VIEWS LEFT; Future    6. Encounter for vitamin deficiency screening    - VITAMIN D,25 HYDROXY; Future    7. Wellness examination  Lab work has been ordered.  Patient follow-up in 1 month for reevaluation.  Continue work on diet and exercise.  Continue to monitor.  We will discuss lab work results and follow-up appointment.    - Comp Metabolic Panel; Future  - CBC WITH DIFFERENTIAL; Future  - Lipid Profile; Future  - VITAMIN D,25 HYDROXY; Future  - TSH WITH REFLEX TO FT4; Future  - HEMOGLOBIN A1C; Future      8. HSV (herpes simplex virus) infection  Chronic but stable problem.  Continue current medication regimen.  Continue to monitor.      Other orders  - acyclovir (ZOVIRAX) 800 MG Tab; Take 800 mg by mouth every day.        Follow-up: Return in about 1 month (around 3/18/2021).

## 2021-05-11 ENCOUNTER — HOSPITAL ENCOUNTER (OUTPATIENT)
Dept: LAB | Facility: MEDICAL CENTER | Age: 41
End: 2021-05-11
Attending: PHYSICIAN ASSISTANT
Payer: COMMERCIAL

## 2021-05-11 DIAGNOSIS — Z00.00 WELLNESS EXAMINATION: ICD-10-CM

## 2021-05-11 DIAGNOSIS — E65 CENTRAL OBESITY: ICD-10-CM

## 2021-05-11 DIAGNOSIS — R82.90 CLOUDY URINE: ICD-10-CM

## 2021-05-11 DIAGNOSIS — Z13.21 ENCOUNTER FOR VITAMIN DEFICIENCY SCREENING: ICD-10-CM

## 2021-05-11 DIAGNOSIS — E66.3 OVERWEIGHT (BMI 25.0-29.9): ICD-10-CM

## 2021-05-11 DIAGNOSIS — R35.0 URINARY FREQUENCY: ICD-10-CM

## 2021-05-24 ENCOUNTER — HOSPITAL ENCOUNTER (OUTPATIENT)
Dept: LAB | Facility: MEDICAL CENTER | Age: 41
End: 2021-05-24
Attending: PHYSICIAN ASSISTANT
Payer: COMMERCIAL

## 2021-05-24 LAB
ALBUMIN SERPL BCP-MCNC: 4.3 G/DL (ref 3.2–4.9)
ALBUMIN/GLOB SERPL: 1.7 G/DL
ALP SERPL-CCNC: 60 U/L (ref 30–99)
ALT SERPL-CCNC: 17 U/L (ref 2–50)
ANION GAP SERPL CALC-SCNC: 9 MMOL/L (ref 7–16)
APPEARANCE UR: CLEAR
AST SERPL-CCNC: 14 U/L (ref 12–45)
BASOPHILS # BLD AUTO: 0.4 % (ref 0–1.8)
BASOPHILS # BLD: 0.03 K/UL (ref 0–0.12)
BILIRUB SERPL-MCNC: 1.1 MG/DL (ref 0.1–1.5)
BILIRUB UR QL STRIP.AUTO: NEGATIVE
BUN SERPL-MCNC: 10 MG/DL (ref 8–22)
CALCIUM SERPL-MCNC: 9.1 MG/DL (ref 8.5–10.5)
CHLORIDE SERPL-SCNC: 105 MMOL/L (ref 96–112)
CHOLEST SERPL-MCNC: 176 MG/DL (ref 100–199)
CO2 SERPL-SCNC: 24 MMOL/L (ref 20–33)
COLOR UR: YELLOW
CREAT SERPL-MCNC: 0.69 MG/DL (ref 0.5–1.4)
EOSINOPHIL # BLD AUTO: 0.06 K/UL (ref 0–0.51)
EOSINOPHIL NFR BLD: 0.8 % (ref 0–6.9)
ERYTHROCYTE [DISTWIDTH] IN BLOOD BY AUTOMATED COUNT: 43.8 FL (ref 35.9–50)
EST. AVERAGE GLUCOSE BLD GHB EST-MCNC: 105 MG/DL
FASTING STATUS PATIENT QL REPORTED: NORMAL
GLOBULIN SER CALC-MCNC: 2.6 G/DL (ref 1.9–3.5)
GLUCOSE SERPL-MCNC: 89 MG/DL (ref 65–99)
GLUCOSE UR STRIP.AUTO-MCNC: NEGATIVE MG/DL
HBA1C MFR BLD: 5.3 % (ref 4–5.6)
HCT VFR BLD AUTO: 43.3 % (ref 37–47)
HDLC SERPL-MCNC: 39 MG/DL
HGB BLD-MCNC: 14.2 G/DL (ref 12–16)
IMM GRANULOCYTES # BLD AUTO: 0.02 K/UL (ref 0–0.11)
IMM GRANULOCYTES NFR BLD AUTO: 0.3 % (ref 0–0.9)
KETONES UR STRIP.AUTO-MCNC: NEGATIVE MG/DL
LDLC SERPL CALC-MCNC: 101 MG/DL
LEUKOCYTE ESTERASE UR QL STRIP.AUTO: NEGATIVE
LYMPHOCYTES # BLD AUTO: 2.09 K/UL (ref 1–4.8)
LYMPHOCYTES NFR BLD: 27.4 % (ref 22–41)
MCH RBC QN AUTO: 30.7 PG (ref 27–33)
MCHC RBC AUTO-ENTMCNC: 32.8 G/DL (ref 33.6–35)
MCV RBC AUTO: 93.7 FL (ref 81.4–97.8)
MICRO URNS: NORMAL
MONOCYTES # BLD AUTO: 0.6 K/UL (ref 0–0.85)
MONOCYTES NFR BLD AUTO: 7.9 % (ref 0–13.4)
NEUTROPHILS # BLD AUTO: 4.84 K/UL (ref 2–7.15)
NEUTROPHILS NFR BLD: 63.2 % (ref 44–72)
NITRITE UR QL STRIP.AUTO: NEGATIVE
NRBC # BLD AUTO: 0 K/UL
NRBC BLD-RTO: 0 /100 WBC
PH UR STRIP.AUTO: 5 [PH] (ref 5–8)
PLATELET # BLD AUTO: 223 K/UL (ref 164–446)
PMV BLD AUTO: 11.9 FL (ref 9–12.9)
POTASSIUM SERPL-SCNC: 4.3 MMOL/L (ref 3.6–5.5)
PROT SERPL-MCNC: 6.9 G/DL (ref 6–8.2)
PROT UR QL STRIP: NEGATIVE MG/DL
RBC # BLD AUTO: 4.62 M/UL (ref 4.2–5.4)
RBC UR QL AUTO: NEGATIVE
SODIUM SERPL-SCNC: 138 MMOL/L (ref 135–145)
SP GR UR STRIP.AUTO: 1.03
TRIGL SERPL-MCNC: 178 MG/DL (ref 0–149)
TSH SERPL DL<=0.005 MIU/L-ACNC: 2.04 UIU/ML (ref 0.38–5.33)
UROBILINOGEN UR STRIP.AUTO-MCNC: 0.2 MG/DL
WBC # BLD AUTO: 7.6 K/UL (ref 4.8–10.8)

## 2021-05-24 PROCEDURE — 36415 COLL VENOUS BLD VENIPUNCTURE: CPT

## 2021-05-24 PROCEDURE — 81003 URINALYSIS AUTO W/O SCOPE: CPT

## 2021-05-24 PROCEDURE — 84443 ASSAY THYROID STIM HORMONE: CPT

## 2021-05-24 PROCEDURE — 83036 HEMOGLOBIN GLYCOSYLATED A1C: CPT

## 2021-05-24 PROCEDURE — 82306 VITAMIN D 25 HYDROXY: CPT

## 2021-05-24 PROCEDURE — 80061 LIPID PANEL: CPT

## 2021-05-24 PROCEDURE — 80053 COMPREHEN METABOLIC PANEL: CPT

## 2021-05-24 PROCEDURE — 85025 COMPLETE CBC W/AUTO DIFF WBC: CPT

## 2021-05-26 LAB — 25(OH)D3 SERPL-MCNC: 20 NG/ML (ref 30–80)

## 2021-05-28 ENCOUNTER — OFFICE VISIT (OUTPATIENT)
Dept: MEDICAL GROUP | Facility: PHYSICIAN GROUP | Age: 41
End: 2021-05-28
Payer: COMMERCIAL

## 2021-05-28 VITALS
DIASTOLIC BLOOD PRESSURE: 64 MMHG | TEMPERATURE: 97.7 F | BODY MASS INDEX: 30.8 KG/M2 | OXYGEN SATURATION: 94 % | HEART RATE: 75 BPM | RESPIRATION RATE: 16 BRPM | SYSTOLIC BLOOD PRESSURE: 106 MMHG | HEIGHT: 68 IN | WEIGHT: 203.2 LBS

## 2021-05-28 DIAGNOSIS — K57.90 DIVERTICULOSIS: ICD-10-CM

## 2021-05-28 DIAGNOSIS — E78.5 DYSLIPIDEMIA (HIGH LDL; LOW HDL): ICD-10-CM

## 2021-05-28 DIAGNOSIS — K58.9 SYMPTOMS CONSISTENT WITH IRRITABLE BOWEL SYNDROME: ICD-10-CM

## 2021-05-28 DIAGNOSIS — M54.50 CHRONIC BILATERAL LOW BACK PAIN WITHOUT SCIATICA: ICD-10-CM

## 2021-05-28 DIAGNOSIS — G89.29 CHRONIC BILATERAL LOW BACK PAIN WITHOUT SCIATICA: ICD-10-CM

## 2021-05-28 DIAGNOSIS — E55.9 VITAMIN D INSUFFICIENCY: ICD-10-CM

## 2021-05-28 PROCEDURE — 99214 OFFICE O/P EST MOD 30 MIN: CPT | Performed by: PHYSICIAN ASSISTANT

## 2021-05-28 ASSESSMENT — FIBROSIS 4 INDEX: FIB4 SCORE: 0.59

## 2021-05-28 ASSESSMENT — PATIENT HEALTH QUESTIONNAIRE - PHQ9: CLINICAL INTERPRETATION OF PHQ2 SCORE: 0

## 2021-06-11 PROBLEM — K58.9 SYMPTOMS CONSISTENT WITH IRRITABLE BOWEL SYNDROME: Status: ACTIVE | Noted: 2021-06-11

## 2021-06-11 PROBLEM — E78.5 DYSLIPIDEMIA (HIGH LDL; LOW HDL): Status: ACTIVE | Noted: 2021-06-11

## 2021-06-11 PROBLEM — E55.9 VITAMIN D INSUFFICIENCY: Status: ACTIVE | Noted: 2021-06-11

## 2021-06-11 PROBLEM — K57.90 DIVERTICULOSIS: Status: ACTIVE | Noted: 2021-06-11

## 2021-06-11 NOTE — PROGRESS NOTES
Chief Complaint   Patient presents with   • Lab Results       HISTORY OF PRESENT ILLNESS: Mira Diaz is an established 41 y.o. female here to discuss the evaluation and management of:    Patient is a pleasant 41-year-old female here today to follow-up on lab work results.  Patient's vitamin D is low.  Vitamin D level 20 on 5/24/2021.  She tells me that she is not taking vitamin D supplementation.  Patient's triglycerides, HDL, and LDL were abnormal.  Statin medication not indicated.  10-year CVD score is low risk.    Patient states for several years she has been experiencing irritable bowel syndrome symptoms.  States she has intermittent episodes where she experiences diarrhea for 5 times a day and this can last for few weeks.  States she varies between constipation and loose stools.  States she has been able to pinpoint certain food triggers that exacerbate symptoms such as pizza.  She tells me that she had diverticulitis a couple years ago.  Patient would like to follow-up with a gastroenterologist.    At the end of her appointment patient, patient mentions that she suffers from chronic bilateral low back pain.  Describes pain as a constant aching pain that can vary in severity.  Occasionally experiences tenderness over bilateral lower back regions.  Due to back pain symptoms states it is difficult for her to exercise like she would like.  She admits that her diet could improve and she needs to be more active.  She has muscle atrophy, weakness, saddle procedure, incontinence, gait abnormalities.    Discussed with patient lab work results nonacute that she has a urinary tract infection.  At her last appointment patient was complaining of urinary frequency.  She is tells me during today's appointment that she does have severe adhesions secondary to an abdominal surgery.        Patient Active Problem List    Diagnosis Date Noted   • Symptoms consistent with irritable bowel syndrome 06/11/2021   •  Diverticulosis 2021   • Vitamin D insufficiency 2021   • Dyslipidemia (high LDL; low HDL) 2021   • Overweight (BMI 25.0-29.9) 2021   • Acute pain of left knee 2021   • HSV (herpes simplex virus) infection 2021   • Pelvic pain in female 2018   • Dysmenorrhea 2018   • Serosal uterine leiomyoma 2018       Allergies:Patient has no known allergies.    Current Outpatient Medications   Medication Sig Dispense Refill   • acyclovir (ZOVIRAX) 800 MG Tab Take 800 mg by mouth every day.     • ibuprofen (MOTRIN) 800 MG Tab Take 1 Tab by mouth every 8 hours as needed for Mild Pain ((1-3 Pain Scale) or fever over 101.5 or if allergic to acetaminophen). 30 Tab 3   • acetaminophen (TYLENOL) 500 MG Tab Take 500-1,000 mg by mouth every 6 hours as needed.       No current facility-administered medications for this visit.       Social History     Tobacco Use   • Smoking status: Former Smoker     Types: Cigarettes     Quit date: 2/3/2016     Years since quittin.3   • Smokeless tobacco: Never Used   Vaping Use   • Vaping Use: Never used   Substance Use Topics   • Alcohol use: Yes     Comment: 1 per month   • Drug use: Yes     Frequency: 1.0 times per week     Types: Oral     Comment: marijuana, last use 48 hours ago       Family Status   Relation Name Status   • Son Adopted Alive     Family History   Problem Relation Age of Onset   • No Known Problems Son        ROS:  Review of Systems   Constitutional: Negative for fever, chills, weight loss and malaise/fatigue.   HENT: Negative for ear pain, nosebleeds, congestion, sore throat and neck pain.    Eyes: Negative for blurred vision.   Respiratory: Negative for cough, sputum production, shortness of breath and wheezing.    Cardiovascular: Negative for chest pain, palpitations, orthopnea and leg swelling.   Gastrointestinal: Negative for heartburn, nausea, vomiting and abdominal pain.   Genitourinary: Negative for dysuria, urgency and  "frequency.   Musculoskeletal: Negative for and joint pain.   Skin: Negative for rash and itching.   Neurological: Negative for dizziness, tingling, tremors, sensory change, focal weakness and headaches.   Endo/Heme/Allergies: Does not bruise/bleed easily.   Psychiatric/Behavioral: Negative for depression, suicidal ideas and memory loss.  The patient is not nervous/anxious and does not have insomnia.    All other systems reviewed and are negative except as in HPI.    Exam: /64 (BP Location: Left arm, Patient Position: Sitting, BP Cuff Size: Adult)   Pulse 75   Temp 36.5 °C (97.7 °F) (Temporal)   Resp 16   Ht 1.727 m (5' 8\")   Wt 92.2 kg (203 lb 3.2 oz)   SpO2 94%  Body mass index is 30.9 kg/m².  General: Normal appearing. No distress.  HEENT: Normocephalic. Eyes conjunctiva clear lids without ptosis, pupils equal and reactive to light accommodation, ears normal shape and contour, canals are clear bilaterally, tympanic membranes are benign, nasal mucosa benign, oropharynx is without erythema, edema or exudates.   Neck: Supple without JVD or bruit. Thyroid is not enlarged.  Pulmonary: Clear to ausculation.  Normal effort. No rales, ronchi, or wheezing.  Cardiovascular: Regular rate and rhythm without murmur.  Abdomen: Nondistended.   Neurologic: Grossly nonfocal.  Cranial nerves are normal.   Lymph: No cervical or supraclavicular lymph nodes are palpable  Skin: Warm and dry.  No rashes or suspicious skin lesions.  Musculoskeletal: Normal gait. No extremity cyanosis, clubbing, or edema.  Bilateral lower lumbar back is tender to palpation.  Lumbar spine is nontender to palpation.  Normal range of motion of upper and lower extremities.  Psych: Normal mood and affect. Alert and oriented x3. Judgment and insight is normal.    Medical decision-making and discussion:  1. Symptoms consistent with irritable bowel syndrome  2. Diverticulosis  Patient has been referred to a gastroenterologist.  Suggested for patient " to try Imodium.  Discussed with patient when she is symptomatic she can do 2-4 mg of Imodium in the a.m. and an additional 2 mg after each loose stool.  Do not exceed more than 60 mg in a 24-hour span.  Advised patient to keep a food diary.  Continue working on hydration.  Advised patient to make sure she is getting adequate fiber.  Continue working on diet, exercise, sleep hygiene, hydration, and developing healthy coping mechanisms for stress anxiety.  Patient has been referred to gastroenterology.  - REFERRAL TO GASTROENTEROLOGY    3. Vitamin D insufficiency  Discussed recent lab work results with patient.  Discussed with patient she has vitamin D deficiency.  This is a new problem.  Advised patient to take over-the-counter 1-2000's of vitamin D once daily.  Will repeat lab work in the near future for reevaluation.    4. Dyslipidemia (high LDL; low HDL)  Chronic problem.  Could improve.  Statin medication not indicated.  Patient's 10-year CVD score is low.  Advised patient continue work on diet and exercise.  Continue to monitor.    5. Chronic bilateral low back pain without sciatica  Chronic problem.  Stable.  Advised patient to continue work on diet, exercise, and normalizing body weight.  Advised patient use proper body mechanics.  Suggested over-the-counter analgesics, stretching, heat and massaging as needed.      Please note that this dictation was created using voice recognition software. I have made every reasonable attempt to correct obvious errors, but I expect that there are errors of grammar and possibly content that I did not discover before finalizing the note.    Assessment/Plan:  1. Symptoms consistent with irritable bowel syndrome  REFERRAL TO GASTROENTEROLOGY   2. Diverticulosis  REFERRAL TO GASTROENTEROLOGY   3. Vitamin D insufficiency     4. Dyslipidemia (high LDL; low HDL)     5. Chronic bilateral low back pain without sciatica         Return in about 6 months (around 11/28/2021), or if  symptoms worsen or fail to improve.

## 2021-06-29 DIAGNOSIS — M54.41 CHRONIC BILATERAL LOW BACK PAIN WITH BILATERAL SCIATICA: ICD-10-CM

## 2021-06-29 DIAGNOSIS — M54.42 CHRONIC BILATERAL LOW BACK PAIN WITH BILATERAL SCIATICA: ICD-10-CM

## 2021-06-29 DIAGNOSIS — M54.40 CHRONIC BILATERAL LOW BACK PAIN WITH SCIATICA, SCIATICA LATERALITY UNSPECIFIED: ICD-10-CM

## 2021-06-29 DIAGNOSIS — G89.29 CHRONIC BILATERAL LOW BACK PAIN WITH SCIATICA, SCIATICA LATERALITY UNSPECIFIED: ICD-10-CM

## 2021-06-29 DIAGNOSIS — G89.29 CHRONIC BILATERAL LOW BACK PAIN WITH BILATERAL SCIATICA: ICD-10-CM

## 2021-06-29 DIAGNOSIS — M54.2 CHRONIC NECK PAIN: ICD-10-CM

## 2021-06-29 DIAGNOSIS — G89.29 CHRONIC NECK PAIN: ICD-10-CM

## 2022-04-19 ENCOUNTER — OFFICE VISIT (OUTPATIENT)
Dept: URGENT CARE | Facility: PHYSICIAN GROUP | Age: 42
End: 2022-04-19
Payer: COMMERCIAL

## 2022-04-19 VITALS
HEART RATE: 80 BPM | DIASTOLIC BLOOD PRESSURE: 70 MMHG | OXYGEN SATURATION: 100 % | RESPIRATION RATE: 18 BRPM | BODY MASS INDEX: 32.58 KG/M2 | SYSTOLIC BLOOD PRESSURE: 132 MMHG | WEIGHT: 215 LBS | HEIGHT: 68 IN | TEMPERATURE: 97.7 F

## 2022-04-19 DIAGNOSIS — M76.31 IT BAND SYNDROME, RIGHT: ICD-10-CM

## 2022-04-19 DIAGNOSIS — M25.561 ACUTE PAIN OF RIGHT KNEE: ICD-10-CM

## 2022-04-19 PROCEDURE — 99213 OFFICE O/P EST LOW 20 MIN: CPT | Performed by: PHYSICIAN ASSISTANT

## 2022-04-19 RX ORDER — METHYLPREDNISOLONE 4 MG/1
TABLET ORAL
Qty: 21 TABLET | Refills: 0 | Status: SHIPPED | OUTPATIENT
Start: 2022-04-19 | End: 2022-12-13

## 2022-04-19 ASSESSMENT — ENCOUNTER SYMPTOMS
DIZZINESS: 0
WEAKNESS: 0
FALLS: 0
HEADACHES: 0
MYALGIAS: 1
NAUSEA: 0
FEVER: 0
CHILLS: 0
TINGLING: 0
VOMITING: 0

## 2022-04-19 ASSESSMENT — FIBROSIS 4 INDEX: FIB4 SCORE: 0.61

## 2022-04-20 NOTE — PATIENT INSTRUCTIONS
Iliotibial Band Syndrome Rehab  Ask your health care provider which exercises are safe for you. Do exercises exactly as told by your health care provider and adjust them as directed. It is normal to feel mild stretching, pulling, tightness, or discomfort as you do these exercises. Stop right away if you feel sudden pain or your pain gets significantly worse. Do not begin these exercises until told by your health care provider.  Stretching and range-of-motion exercises  These exercises warm up your muscles and joints and improve the movement and flexibility of your hip and pelvis.  Quadriceps stretch, prone    1. Lie on your abdomen on a firm surface, such as a bed or padded floor (prone position).  2. Bend your left / right knee and reach back to hold your ankle or pant leg. If you cannot reach your ankle or pant leg, loop a belt around your foot and grab the belt instead.  3. Gently pull your heel toward your buttocks. Your knee should not slide out to the side. You should feel a stretch in the front of your thigh and knee (quadriceps).  4. Hold this position for __________ seconds.  Repeat __________ times. Complete this exercise __________ times a day.  Iliotibial band stretch  An iliotibial band is a strong band of muscle tissue that runs from the outer side of your hip to the outer side of your thigh and knee.  1. Lie on your side with your left / right leg in the top position.  2. Bend both of your knees and grab your left / right ankle. Stretch out your bottom arm to help you balance.  3. Slowly bring your top knee back so your thigh goes behind your trunk.  4. Slowly lower your top leg toward the floor until you feel a gentle stretch on the outside of your left / right hip and thigh. If you do not feel a stretch and your knee will not fall farther, place the heel of your other foot on top of your knee and pull your knee down toward the floor with your foot.  5. Hold this position for __________  seconds.  Repeat __________ times. Complete this exercise __________ times a day.  Strengthening exercises  These exercises build strength and endurance in your hip and pelvis. Endurance is the ability to use your muscles for a long time, even after they get tired.  Straight leg raises, side-lying  This exercise strengthens the muscles that rotate the leg at the hip and move it away from your body (hip abductors).  1. Lie on your side with your left / right leg in the top position. Lie so your head, shoulder, hip, and knee line up. You may bend your bottom knee to help you balance.  2. Roll your hips slightly forward so your hips are stacked directly over each other and your left / right knee is facing forward.  3. Tense the muscles in your outer thigh and lift your top leg 4-6 inches (10-15 cm).  4. Hold this position for __________ seconds.  5. Slowly return to the starting position. Let your muscles relax completely before doing another repetition.  Repeat __________ times. Complete this exercise __________ times a day.  Leg raises, prone  This exercise strengthens the muscles that move the hips (hip extensors).  1. Lie on your abdomen on your bed or a firm surface. You can put a pillow under your hips if that is more comfortable for your lower back.  2. Bend your left / right knee so your foot is straight up in the air.  3. Squeeze your buttocks muscles and lift your left / right thigh off the bed. Do not let your back arch.  4. Tense your thigh muscle as hard as you can without increasing any knee pain.  5. Hold this position for __________ seconds.  6. Slowly lower your leg to the starting position and allow it to relax completely.  Repeat __________ times. Complete this exercise __________ times a day.  Hip hike  1. Stand sideways on a bottom step. Stand on your left / right leg with your other foot unsupported next to the step. You can hold on to the railing or wall for balance if needed.  2. Keep your knees  straight and your torso square. Then lift your left / right hip up toward the ceiling.  3. Slowly let your left / right hip lower toward the floor, past the starting position. Your foot should get closer to the floor. Do not lean or bend your knees.  Repeat __________ times. Complete this exercise __________ times a day.  This information is not intended to replace advice given to you by your health care provider. Make sure you discuss any questions you have with your health care provider.  Document Released: 12/18/2006 Document Revised: 04/09/2020 Document Reviewed: 10/09/2019  Elsevier Patient Education © 2020 [employer]  Iliotibial Band Syndrome    Iliotibial band syndrome (ITBS) is a condition that often causes knee pain. It can also cause pain in the outside of your hip, thigh, and knee. The iliotibial band is a strip of tissue that runs from the outside of your hip and down your thigh to the outside of your knee.  Repeatedly bending and straightening your knee can irritate the iliotibial band.  What are the causes?  This condition is caused by inflammation and irritation from the friction of the iliotibial band moving over the thigh bone (femur) when you repeatedly bend and straighten your knee.  What increases the risk?  This condition is more likely to develop in people who:  · Frequently change elevation during their workouts.  · Run very long distances.  · Recently increased the length or intensity of their workouts.  · Run downhill often, or just started running downhill.  · Ride a bike very far or often.  You may also be at greater risk if you start a new workout routine without first warming up or if you have a job that requires you to bend, squat, or climb frequently.  What are the signs or symptoms?  Symptoms of this condition include:  · Pain along the outside of your knee that may be worse with activity, especially running or going up and down stairs.  · A “snapping” sensation over your  knee.  · Swelling on the outside of your knee.  · Pain or a feeling of tightness in your hip.  How is this diagnosed?  This condition is diagnosed based on your symptoms, medical history, and physical exam. You may also see a health care provider who specializes in reducing pain and increasing mobility (physical therapist). A physical therapist may do an exam to check your balance, movement, and way of walking or running (gait) to see whether the way you move could contribute to your injury. You may also have tests to measure your strength, flexibility, and range of motion.  How is this treated?  Treatment for this condition includes:  · Resting and limiting exercise.  · Returning to activities gradually.  · Doing range-of-motion and strengthening exercises (physical therapy) as told by your health care provider.  · Including low-impact activities, such as swimming, in your exercise routine.  Follow these instructions at home:  · If directed, apply ice to the injured area.  ? Put ice in a plastic bag.  ? Place a towel between your skin and the bag.  ? Leave the ice on for 20 minutes, 2-3 times per day.  · Return to your normal activities as told by your health care provider. Ask your health care provider what activities are safe for you.  · Keep all follow-up visits with your health care provider. This is important.  Contact a health care provider if:  · Your pain does not improve or gets worse despite treatment.  This information is not intended to replace advice given to you by your health care provider. Make sure you discuss any questions you have with your health care provider.  Document Released: 06/09/2003 Document Revised: 11/30/2018 Document Reviewed: 01/19/2018  Elsevier Patient Education © 2020 [employer]

## 2022-11-10 ENCOUNTER — TELEPHONE (OUTPATIENT)
Dept: HEALTH INFORMATION MANAGEMENT | Facility: OTHER | Age: 42
End: 2022-11-10

## 2022-12-11 SDOH — ECONOMIC STABILITY: HOUSING INSECURITY
IN THE LAST 12 MONTHS, WAS THERE A TIME WHEN YOU DID NOT HAVE A STEADY PLACE TO SLEEP OR SLEPT IN A SHELTER (INCLUDING NOW)?: NO

## 2022-12-11 SDOH — ECONOMIC STABILITY: HOUSING INSECURITY: IN THE LAST 12 MONTHS, HOW MANY PLACES HAVE YOU LIVED?: 1

## 2022-12-11 SDOH — ECONOMIC STABILITY: TRANSPORTATION INSECURITY
IN THE PAST 12 MONTHS, HAS LACK OF TRANSPORTATION KEPT YOU FROM MEETINGS, WORK, OR FROM GETTING THINGS NEEDED FOR DAILY LIVING?: NO

## 2022-12-11 SDOH — ECONOMIC STABILITY: FOOD INSECURITY: WITHIN THE PAST 12 MONTHS, YOU WORRIED THAT YOUR FOOD WOULD RUN OUT BEFORE YOU GOT MONEY TO BUY MORE.: SOMETIMES TRUE

## 2022-12-11 SDOH — ECONOMIC STABILITY: FOOD INSECURITY: WITHIN THE PAST 12 MONTHS, THE FOOD YOU BOUGHT JUST DIDN'T LAST AND YOU DIDN'T HAVE MONEY TO GET MORE.: SOMETIMES TRUE

## 2022-12-11 SDOH — ECONOMIC STABILITY: INCOME INSECURITY: HOW HARD IS IT FOR YOU TO PAY FOR THE VERY BASICS LIKE FOOD, HOUSING, MEDICAL CARE, AND HEATING?: SOMEWHAT HARD

## 2022-12-11 SDOH — ECONOMIC STABILITY: TRANSPORTATION INSECURITY
IN THE PAST 12 MONTHS, HAS LACK OF RELIABLE TRANSPORTATION KEPT YOU FROM MEDICAL APPOINTMENTS, MEETINGS, WORK OR FROM GETTING THINGS NEEDED FOR DAILY LIVING?: NO

## 2022-12-11 SDOH — HEALTH STABILITY: PHYSICAL HEALTH: ON AVERAGE, HOW MANY DAYS PER WEEK DO YOU ENGAGE IN MODERATE TO STRENUOUS EXERCISE (LIKE A BRISK WALK)?: 1 DAY

## 2022-12-11 SDOH — ECONOMIC STABILITY: TRANSPORTATION INSECURITY
IN THE PAST 12 MONTHS, HAS THE LACK OF TRANSPORTATION KEPT YOU FROM MEDICAL APPOINTMENTS OR FROM GETTING MEDICATIONS?: NO

## 2022-12-11 SDOH — ECONOMIC STABILITY: INCOME INSECURITY: IN THE LAST 12 MONTHS, WAS THERE A TIME WHEN YOU WERE NOT ABLE TO PAY THE MORTGAGE OR RENT ON TIME?: NO

## 2022-12-11 SDOH — HEALTH STABILITY: PHYSICAL HEALTH: ON AVERAGE, HOW MANY MINUTES DO YOU ENGAGE IN EXERCISE AT THIS LEVEL?: 30 MIN

## 2022-12-11 SDOH — HEALTH STABILITY: MENTAL HEALTH
STRESS IS WHEN SOMEONE FEELS TENSE, NERVOUS, ANXIOUS, OR CAN'T SLEEP AT NIGHT BECAUSE THEIR MIND IS TROUBLED. HOW STRESSED ARE YOU?: TO SOME EXTENT

## 2022-12-11 ASSESSMENT — SOCIAL DETERMINANTS OF HEALTH (SDOH)
HOW OFTEN DO YOU ATTENT MEETINGS OF THE CLUB OR ORGANIZATION YOU BELONG TO?: NEVER
HOW OFTEN DO YOU GET TOGETHER WITH FRIENDS OR RELATIVES?: MORE THAN THREE TIMES A WEEK
HOW OFTEN DO YOU ATTENT MEETINGS OF THE CLUB OR ORGANIZATION YOU BELONG TO?: NEVER
DO YOU BELONG TO ANY CLUBS OR ORGANIZATIONS SUCH AS CHURCH GROUPS UNIONS, FRATERNAL OR ATHLETIC GROUPS, OR SCHOOL GROUPS?: NO
HOW OFTEN DO YOU ATTEND CHURCH OR RELIGIOUS SERVICES?: NEVER
HOW OFTEN DO YOU HAVE A DRINK CONTAINING ALCOHOL: 2-4 TIMES A MONTH
DO YOU BELONG TO ANY CLUBS OR ORGANIZATIONS SUCH AS CHURCH GROUPS UNIONS, FRATERNAL OR ATHLETIC GROUPS, OR SCHOOL GROUPS?: NO
WITHIN THE PAST 12 MONTHS, YOU WORRIED THAT YOUR FOOD WOULD RUN OUT BEFORE YOU GOT THE MONEY TO BUY MORE: SOMETIMES TRUE
HOW MANY DRINKS CONTAINING ALCOHOL DO YOU HAVE ON A TYPICAL DAY WHEN YOU ARE DRINKING: 1 OR 2
HOW OFTEN DO YOU ATTEND CHURCH OR RELIGIOUS SERVICES?: NEVER
IN A TYPICAL WEEK, HOW MANY TIMES DO YOU TALK ON THE PHONE WITH FAMILY, FRIENDS, OR NEIGHBORS?: ONCE A WEEK
HOW OFTEN DO YOU HAVE SIX OR MORE DRINKS ON ONE OCCASION: LESS THAN MONTHLY
HOW OFTEN DO YOU GET TOGETHER WITH FRIENDS OR RELATIVES?: MORE THAN THREE TIMES A WEEK
HOW HARD IS IT FOR YOU TO PAY FOR THE VERY BASICS LIKE FOOD, HOUSING, MEDICAL CARE, AND HEATING?: SOMEWHAT HARD
IN A TYPICAL WEEK, HOW MANY TIMES DO YOU TALK ON THE PHONE WITH FAMILY, FRIENDS, OR NEIGHBORS?: ONCE A WEEK

## 2022-12-11 ASSESSMENT — LIFESTYLE VARIABLES
HOW OFTEN DO YOU HAVE SIX OR MORE DRINKS ON ONE OCCASION: LESS THAN MONTHLY
SKIP TO QUESTIONS 9-10: 0
HOW MANY STANDARD DRINKS CONTAINING ALCOHOL DO YOU HAVE ON A TYPICAL DAY: 1 OR 2
HOW OFTEN DO YOU HAVE A DRINK CONTAINING ALCOHOL: 2-4 TIMES A MONTH
AUDIT-C TOTAL SCORE: 3

## 2022-12-14 ENCOUNTER — OFFICE VISIT (OUTPATIENT)
Dept: MEDICAL GROUP | Facility: MEDICAL CENTER | Age: 42
End: 2022-12-14
Payer: COMMERCIAL

## 2022-12-14 VITALS
HEART RATE: 67 BPM | HEIGHT: 69 IN | DIASTOLIC BLOOD PRESSURE: 64 MMHG | WEIGHT: 204.15 LBS | SYSTOLIC BLOOD PRESSURE: 110 MMHG | BODY MASS INDEX: 30.24 KG/M2 | TEMPERATURE: 97.7 F | OXYGEN SATURATION: 97 %

## 2022-12-14 DIAGNOSIS — Z23 NEED FOR VACCINATION: ICD-10-CM

## 2022-12-14 DIAGNOSIS — R00.2 PALPITATIONS: ICD-10-CM

## 2022-12-14 DIAGNOSIS — E55.9 VITAMIN D DEFICIENCY: ICD-10-CM

## 2022-12-14 DIAGNOSIS — N92.4 EXCESSIVE BLEEDING IN PREMENOPAUSAL PERIOD: ICD-10-CM

## 2022-12-14 DIAGNOSIS — R73.03 PREDIABETES: ICD-10-CM

## 2022-12-14 DIAGNOSIS — D50.9 IRON DEFICIENCY ANEMIA, UNSPECIFIED IRON DEFICIENCY ANEMIA TYPE: ICD-10-CM

## 2022-12-14 DIAGNOSIS — R53.83 OTHER FATIGUE: ICD-10-CM

## 2022-12-14 DIAGNOSIS — Z13.220 LIPID SCREENING: ICD-10-CM

## 2022-12-14 DIAGNOSIS — L65.9 HAIR LOSS: ICD-10-CM

## 2022-12-14 PROCEDURE — 90471 IMMUNIZATION ADMIN: CPT | Performed by: INTERNAL MEDICINE

## 2022-12-14 PROCEDURE — 99214 OFFICE O/P EST MOD 30 MIN: CPT | Mod: 25 | Performed by: INTERNAL MEDICINE

## 2022-12-14 PROCEDURE — 93000 ELECTROCARDIOGRAM COMPLETE: CPT | Performed by: INTERNAL MEDICINE

## 2022-12-14 PROCEDURE — 90686 IIV4 VACC NO PRSV 0.5 ML IM: CPT | Performed by: INTERNAL MEDICINE

## 2022-12-14 RX ORDER — OSELTAMIVIR PHOSPHATE 75 MG/1
75 CAPSULE ORAL EVERY 12 HOURS
COMMUNITY
Start: 2022-11-19 | End: 2022-12-14

## 2022-12-14 RX ORDER — OSELTAMIVIR PHOSPHATE 75 MG/1
CAPSULE ORAL
COMMUNITY
Start: 2022-11-19 | End: 2022-12-14

## 2022-12-14 ASSESSMENT — PATIENT HEALTH QUESTIONNAIRE - PHQ9: CLINICAL INTERPRETATION OF PHQ2 SCORE: 0

## 2022-12-14 ASSESSMENT — FIBROSIS 4 INDEX: FIB4 SCORE: 0.61

## 2022-12-14 NOTE — ASSESSMENT & PLAN NOTE
Chronic problem, differentials include PVCs, thyroid disorder, nutritional deficiencies or electrolyte abnormalities.  EKG obtained in clinic shows no ectopies, normal rate and rhythm normal QTC.   -Obtain blood work to evaluate for thyroid levels, electrolytes, follow-up in 2 weeks to discuss.   - Consider echo and Zio patch.

## 2022-12-14 NOTE — ASSESSMENT & PLAN NOTE
This is a chronic problem, pattern: diffuse.   Differentials include: Iron deficiency, vitamin B12 deficiency, vitamin D, thyroid disorder, anemia.

## 2022-12-14 NOTE — LETTER
[facility]  Judy Young M.D.  [address]  [place] [address]  Fax: [phone]   Authorization for Release/Disclosure of   Protected Health Information   Name: EDEN MOREAU : 1980 SSN: [id number]   Address: [address] [place] [address] [place] [address] Phone:    [phone] (home)    I authorize the entity listed below to release/disclose the PHI below to:   [facility]/Judy Young M.D. and Judy Young M.D.   Provider or Entity Name   Amy Leonel [place]    Address   City, State, [facility]   Phone:      Fax:     Reason for request: continuity of care   Information to be released:    [  ] LAST COLONOSCOPY,  including any PATH REPORT and follow-up  [  ] LAST FIT/COLOGUARD RESULT [  ] LAST DEXA  [  ] LAST MAMMOGRAM  [  ] LAST PAP  [  ] LAST LABS [  ] RETINA EXAM REPORT  [  ] IMMUNIZATION RECORDS  [  x] Release all info      [  ] Check here and initial the line next to each item to release ALL health information INCLUDING  _____ Care and treatment for drug and / or alcohol abuse  _____ HIV testing, infection status, or AIDS  _____ Genetic Testing    DATES OF SERVICE OR TIME PERIOD TO BE DISCLOSED: _____________  I understand and acknowledge that:  * This Authorization may be revoked at any time by you in writing, except if your health information has already been used or disclosed.  * Your health information that will be used or disclosed as a result of you signing this authorization could be re-disclosed by the recipient. If this occurs, your re-disclosed health information may no longer be protected by State or Federal laws.  * You may refuse to sign this Authorization. Your refusal will not affect your ability to obtain treatment.  * This Authorization becomes effective upon signing and will  on (date) __________.      If no date is indicated, this Authorization will  one (1) year from the signature date.    Name: Eden Mcelroy  Joe    Signature:   Date:     12/14/2022       PLEASE FAX REQUESTED RECORDS BACK TO: [phone]

## 2022-12-14 NOTE — LETTER
[facility]  Pcp Pt States None  No address on file  Fax: [phone]   Authorization for Release/Disclosure of   Protected Health Information   Name: EDEN DIAZ : 1980 SSN: [id number]   Address: [address] [place] [address] [address] [address] Phone:    [phone] (home)    I authorize the entity listed below to release/disclose the PHI below to:   [facility]/Pcp Pt States None and Judy Young M.D.   Provider or Entity Name:  Dr. Amy Castaneda [place]   Address   [facility], [facility], [facility]   Phone:      Fax:     Reason for request: continuity of care   Information to be released:    [  ] LAST COLONOSCOPY,  including any PATH REPORT and follow-up  [  ] LAST FIT/COLOGUARD RESULT [  ] LAST DEXA  [  ] LAST MAMMOGRAM  [  ] LAST PAP  [  ] LAST LABS [  ] RETINA EXAM REPORT  [  ] IMMUNIZATION RECORDS  [  ] Release all info      [  ] Check here and initial the line next to each item to release ALL health information INCLUDING  _____ Care and treatment for drug and / or alcohol abuse  _____ HIV testing, infection status, or AIDS  _____ Genetic Testing    DATES OF SERVICE OR TIME PERIOD TO BE DISCLOSED: _____________  I understand and acknowledge that:  * This Authorization may be revoked at any time by you in writing, except if your health information has already been used or disclosed.  * Your health information that will be used or disclosed as a result of you signing this authorization could be re-disclosed by the recipient. If this occurs, your re-disclosed health information may no longer be protected by State or Federal laws.  * You may refuse to sign this Authorization. Your refusal will not affect your ability to obtain treatment.  * This Authorization becomes effective upon signing and will  on (date) __________.      If no date is indicated, this Authorization will  one (1) year from the signature date.    Name: Eden Diaz    Signature:   Date:     2022        PLEASE FAX REQUESTED RECORDS BACK TO: [phone]

## 2022-12-14 NOTE — PROGRESS NOTES
Subjective:     Chief Complaint   Patient presents with    Hair Loss     6 mo     Palpitations     heart murmer x 6 mo       Diagnoses of Palpitations, Need for vaccination, Hair loss, Other fatigue, Excessive bleeding in premenopausal period, Vitamin D deficiency, Lipid screening, Prediabetes, and Iron deficiency anemia, unspecified iron deficiency anemia type were pertinent to this visit.    HISTORY OF THE PRESENT ILLNESS: Patient is a 42 y.o. female. This pleasant patient is here today to establish care.   Problem   Hair Loss    This is a chronic problem, has been going on for the last 6-12 months.  Patient has noticed bundles of 10-15 hairs falling from the root, primarily from occipital area.  No patches.  She has tried taking biotin and vitamins in the past, has not been consistent.  Current supplementations: None.  Associated symptoms: Dry skin, palpitations, chronic fatigue and heavy menstrual bleedings.       Palpitations    This is a chronic problem, daily symptoms described as heart fluttering inside the chest.  Patient exercises daily.  No associated chest pain, cough, shortness of breath.  She reports prior history of irregular heartbeats, which was evaluated with Holter monitor and no significant abnormalities were found at that time.  Patient was also told that she has heart murmur.         Other Fatigue    This is a chronic problem, associated with dry skin, hair loss, palpitations.  Supplements intake: None.  Sleep: 4- 8 hours on average, nonrestorative.       Prediabetes   Need for Vaccination    Patient is due for influenza vaccination, no prior allergic reactions to influenza vaccine.          Past Medical History:   Diagnosis Date    Allergy     Anxiety     Arrhythmia     palpitations, irregular heart beat that patient was to follow up with    Asthma     denies inhaler need    Breath shortness     states chest feels heavy sometimes, sob with exertion    Depression     Gynecological disorder      Heart burn     Hx of cold sores     Infectious disease     cold sores    Migraine     Osteoporosis     Pain     abdominal pain    Psychiatric disorder     depression/anxiety     Past Surgical History:   Procedure Laterality Date    PELVISCOPY  2/3/2018    Procedure: PELVISCOPY - LYSIS OF ADHESIONS;  Surgeon: Marek Daugherty M.D.;  Location: SURGERY [facility];  Service: Gynecology    MYOMECTOMY  2/3/2018    Procedure: MYOMECTOMY;  Surgeon: Marek Daugherty M.D.;  Location: SURGERY [facility];  Service: Gynecology    LAPAROSCOPY  2015    Performed by Marek Daugherty M.D. at SURGERY [facility]    TONSILLECTOMY  1986    APPENDECTOMY      TUBAL LIGATION       Family History   Problem Relation Age of Onset    Heart Disease Father          from heart attack, 65    Cancer Maternal Grandmother         Ling cancer    Cancer Maternal Grandfather         Brain cancer    Heart Disease Paternal Grandmother          of MI    Cancer Paternal Grandfather         Pancreatic    No Known Problems Son     Diabetes Neg Hx      Social History     Tobacco Use    Smoking status: Former     Packs/day: 0.00     Years: 15.00     Pack years: 0.00     Types: Cigarettes     Quit date: 2020     Years since quittin.8    Smokeless tobacco: Never    Tobacco comments:     Social smoker never   Vaping Use    Vaping Use: Never used   Substance Use Topics    Alcohol use: Yes     Alcohol/week: 2.4 oz     Types: 1 Glasses of wine, 1 Cans of beer, 1 Shots of liquor, 1 Standard drinks or equivalent per week     Comment: I dont drink regularly, havent for about two mo    Drug use: Yes     Frequency: 1.0 times per week     Types: Oral     Comment: marijuana, last use 48 hours ago     Current Outpatient Medications Ordered in Epic   Medication Sig Dispense Refill    acyclovir (ZOVIRAX) 800 MG Tab Take 800 mg by mouth every day.      ibuprofen (MOTRIN) 800 MG Tab Take 1 Tab by mouth every 8 hours as needed for Mild Pain  "((1-3 Pain Scale) or fever over 101.5 or if allergic to acetaminophen). 30 Tab 3    acetaminophen (TYLENOL) 500 MG Tab Take 500-1,000 mg by mouth every 6 hours as needed.       No current Epic-ordered facility-administered medications on file.     ROS  Negative except as above.      Objective:     Exam: /64 (BP Location: Left arm, Patient Position: Sitting, BP Cuff Size: Adult)   Pulse 67   Temp 36.5 °C (97.7 °F) (Temporal)   Ht 1.753 m (5' 9\")   Wt 92.6 kg (204 lb 2.3 oz)   SpO2 97%  Body mass index is 30.15 kg/m².    Physical Exam  Constitutional:       General: She is not in acute distress.     Appearance: She is not toxic-appearing.   HENT:      Head: Normocephalic and atraumatic.      Nose: Nose normal. No congestion.      Mouth/Throat:      Pharynx: No oropharyngeal exudate.   Eyes:      General: No scleral icterus.  Neck:      Thyroid: No thyroid mass or thyroid tenderness.   Cardiovascular:      Rate and Rhythm: Normal rate and regular rhythm.      Pulses: Normal pulses.      Heart sounds: Normal heart sounds. No murmur heard.  Pulmonary:      Effort: Pulmonary effort is normal. No respiratory distress.      Breath sounds: Normal breath sounds. No wheezing.   Neurological:      Mental Status: She is alert.     Labs: per orders     Assessment & Plan:   42 y.o. female with the following -    Problem List Items Addressed This Visit       Hair loss (Chronic)     This is a chronic problem, pattern: diffuse.   Differentials include: Iron deficiency, vitamin B12 deficiency, vitamin D, thyroid disorder, anemia.           Relevant Orders    Comp Metabolic Panel    Palpitations (Chronic)     Chronic problem, differentials include PVCs, thyroid disorder, nutritional deficiencies or electrolyte abnormalities.  EKG obtained in clinic shows no ectopies, normal rate and rhythm normal QTC.   -Obtain blood work to evaluate for thyroid levels, electrolytes, follow-up in 2 weeks to discuss.   - Consider echo and Zio " patch.           Relevant Orders    EKG - Clinic Performed    TSH    FREE THYROXINE    Comp Metabolic Panel    Need for vaccination     Patient has no prior history of allergic or adverse reactions to influenza vaccine.  Administered as directed.           Other fatigue     Differentials include nutritional deficiencies, thyroid disorder, anemia, sleep deprivation.           Relevant Orders    TSH    FREE THYROXINE    Comp Metabolic Panel    VITAMIN B12    CBC WITH DIFFERENTIAL    Prediabetes    Relevant Orders    HEMOGLOBIN A1C     Other Visit Diagnoses       Excessive bleeding in premenopausal period        Relevant Orders    IRON/TOTAL IRON BIND    FERRITIN    RETICULOCYTES COUNT    CBC WITH DIFFERENTIAL    Vitamin D deficiency        Relevant Orders    VITAMIN D,25 HYDROXY (DEFICIENCY)    Lipid screening        Relevant Orders    Lipid Profile    Iron deficiency anemia, unspecified iron deficiency anemia type        Relevant Orders    IRON/TOTAL IRON BIND    FERRITIN    RETICULOCYTES COUNT    CBC WITH DIFFERENTIAL          Return in about 2 weeks (around 12/28/2022), or if symptoms worsen or fail to improve, for labs  .    Please note that this dictation was created using voice recognition software. I have made every reasonable attempt to correct obvious errors, but I expect that there are errors of grammar and possibly content that I did not discover before finalizing the note.

## 2022-12-28 ENCOUNTER — HOSPITAL ENCOUNTER (OUTPATIENT)
Dept: LAB | Facility: MEDICAL CENTER | Age: 42
End: 2022-12-28
Attending: INTERNAL MEDICINE
Payer: COMMERCIAL

## 2022-12-28 DIAGNOSIS — L65.9 HAIR LOSS: ICD-10-CM

## 2022-12-28 DIAGNOSIS — Z13.220 LIPID SCREENING: ICD-10-CM

## 2022-12-28 DIAGNOSIS — R00.2 PALPITATIONS: ICD-10-CM

## 2022-12-28 DIAGNOSIS — R53.83 OTHER FATIGUE: ICD-10-CM

## 2022-12-28 DIAGNOSIS — E55.9 VITAMIN D DEFICIENCY: ICD-10-CM

## 2022-12-28 DIAGNOSIS — D50.9 IRON DEFICIENCY ANEMIA, UNSPECIFIED IRON DEFICIENCY ANEMIA TYPE: ICD-10-CM

## 2022-12-28 DIAGNOSIS — N92.4 EXCESSIVE BLEEDING IN PREMENOPAUSAL PERIOD: ICD-10-CM

## 2022-12-28 DIAGNOSIS — R73.03 PREDIABETES: ICD-10-CM

## 2022-12-28 LAB
25(OH)D3 SERPL-MCNC: 15 NG/ML (ref 30–100)
ALBUMIN SERPL BCP-MCNC: 4.4 G/DL (ref 3.2–4.9)
ALBUMIN/GLOB SERPL: 1.7 G/DL
ALP SERPL-CCNC: 68 U/L (ref 30–99)
ALT SERPL-CCNC: 13 U/L (ref 2–50)
ANION GAP SERPL CALC-SCNC: 12 MMOL/L (ref 7–16)
AST SERPL-CCNC: 16 U/L (ref 12–45)
BASOPHILS # BLD AUTO: 0.6 % (ref 0–1.8)
BASOPHILS # BLD: 0.05 K/UL (ref 0–0.12)
BILIRUB SERPL-MCNC: 0.6 MG/DL (ref 0.1–1.5)
BUN SERPL-MCNC: 11 MG/DL (ref 8–22)
CALCIUM ALBUM COR SERPL-MCNC: 8.8 MG/DL (ref 8.5–10.5)
CALCIUM SERPL-MCNC: 9.1 MG/DL (ref 8.5–10.5)
CHLORIDE SERPL-SCNC: 106 MMOL/L (ref 96–112)
CHOLEST SERPL-MCNC: 190 MG/DL (ref 100–199)
CO2 SERPL-SCNC: 20 MMOL/L (ref 20–33)
CREAT SERPL-MCNC: 0.69 MG/DL (ref 0.5–1.4)
EOSINOPHIL # BLD AUTO: 0.1 K/UL (ref 0–0.51)
EOSINOPHIL NFR BLD: 1.2 % (ref 0–6.9)
ERYTHROCYTE [DISTWIDTH] IN BLOOD BY AUTOMATED COUNT: 43 FL (ref 35.9–50)
EST. AVERAGE GLUCOSE BLD GHB EST-MCNC: 111 MG/DL
FASTING STATUS PATIENT QL REPORTED: NORMAL
FERRITIN SERPL-MCNC: 23.2 NG/ML (ref 10–291)
GFR SERPLBLD CREATININE-BSD FMLA CKD-EPI: 112 ML/MIN/1.73 M 2
GLOBULIN SER CALC-MCNC: 2.6 G/DL (ref 1.9–3.5)
GLUCOSE SERPL-MCNC: 89 MG/DL (ref 65–99)
HBA1C MFR BLD: 5.5 % (ref 4–5.6)
HCT VFR BLD AUTO: 42.2 % (ref 37–47)
HDLC SERPL-MCNC: 52 MG/DL
HGB BLD-MCNC: 13.9 G/DL (ref 12–16)
HGB RETIC QN AUTO: 34.8 PG/CELL (ref 29–35)
IMM GRANULOCYTES # BLD AUTO: 0.02 K/UL (ref 0–0.11)
IMM GRANULOCYTES NFR BLD AUTO: 0.2 % (ref 0–0.9)
IMM RETICS NFR: 12.3 % (ref 9.3–17.4)
IRON SATN MFR SERPL: 23 % (ref 15–55)
IRON SERPL-MCNC: 43 UG/DL (ref 40–170)
LDLC SERPL CALC-MCNC: 114 MG/DL
LYMPHOCYTES # BLD AUTO: 2.27 K/UL (ref 1–4.8)
LYMPHOCYTES NFR BLD: 26.4 % (ref 22–41)
MCH RBC QN AUTO: 30.8 PG (ref 27–33)
MCHC RBC AUTO-ENTMCNC: 32.9 G/DL (ref 33.6–35)
MCV RBC AUTO: 93.4 FL (ref 81.4–97.8)
MONOCYTES # BLD AUTO: 0.68 K/UL (ref 0–0.85)
MONOCYTES NFR BLD AUTO: 7.9 % (ref 0–13.4)
NEUTROPHILS # BLD AUTO: 5.49 K/UL (ref 2–7.15)
NEUTROPHILS NFR BLD: 63.7 % (ref 44–72)
NRBC # BLD AUTO: 0 K/UL
NRBC BLD-RTO: 0 /100 WBC
PLATELET # BLD AUTO: 285 K/UL (ref 164–446)
PMV BLD AUTO: 12 FL (ref 9–12.9)
POTASSIUM SERPL-SCNC: 4.1 MMOL/L (ref 3.6–5.5)
PROT SERPL-MCNC: 7 G/DL (ref 6–8.2)
RBC # BLD AUTO: 4.52 M/UL (ref 4.2–5.4)
RETICS # AUTO: 0.07 M/UL (ref 0.04–0.06)
RETICS/RBC NFR: 1.6 % (ref 0.8–2.1)
SODIUM SERPL-SCNC: 138 MMOL/L (ref 135–145)
T4 FREE SERPL-MCNC: 1.13 NG/DL (ref 0.93–1.7)
TIBC SERPL-MCNC: 188 UG/DL (ref 250–450)
TRIGL SERPL-MCNC: 118 MG/DL (ref 0–149)
TSH SERPL DL<=0.005 MIU/L-ACNC: 1.36 UIU/ML (ref 0.38–5.33)
UIBC SERPL-MCNC: 145 UG/DL (ref 110–370)
VIT B12 SERPL-MCNC: 403 PG/ML (ref 211–911)
WBC # BLD AUTO: 8.6 K/UL (ref 4.8–10.8)

## 2022-12-28 PROCEDURE — 83550 IRON BINDING TEST: CPT

## 2022-12-28 PROCEDURE — 85046 RETICYTE/HGB CONCENTRATE: CPT

## 2022-12-28 PROCEDURE — 82728 ASSAY OF FERRITIN: CPT

## 2022-12-28 PROCEDURE — 80053 COMPREHEN METABOLIC PANEL: CPT

## 2022-12-28 PROCEDURE — 36415 COLL VENOUS BLD VENIPUNCTURE: CPT

## 2022-12-28 PROCEDURE — 85025 COMPLETE CBC W/AUTO DIFF WBC: CPT

## 2022-12-28 PROCEDURE — 84439 ASSAY OF FREE THYROXINE: CPT

## 2022-12-28 PROCEDURE — 83036 HEMOGLOBIN GLYCOSYLATED A1C: CPT

## 2022-12-28 PROCEDURE — 80061 LIPID PANEL: CPT

## 2022-12-28 PROCEDURE — 82607 VITAMIN B-12: CPT

## 2022-12-28 PROCEDURE — 83540 ASSAY OF IRON: CPT

## 2022-12-28 PROCEDURE — 84443 ASSAY THYROID STIM HORMONE: CPT

## 2022-12-28 PROCEDURE — 82306 VITAMIN D 25 HYDROXY: CPT

## 2022-12-29 ENCOUNTER — OFFICE VISIT (OUTPATIENT)
Dept: MEDICAL GROUP | Facility: MEDICAL CENTER | Age: 42
End: 2022-12-29
Payer: COMMERCIAL

## 2022-12-29 VITALS
BODY MASS INDEX: 30.32 KG/M2 | SYSTOLIC BLOOD PRESSURE: 102 MMHG | HEART RATE: 65 BPM | HEIGHT: 69 IN | TEMPERATURE: 97.8 F | OXYGEN SATURATION: 97 % | DIASTOLIC BLOOD PRESSURE: 62 MMHG | WEIGHT: 204.7 LBS

## 2022-12-29 DIAGNOSIS — E55.9 VITAMIN D DEFICIENCY: ICD-10-CM

## 2022-12-29 DIAGNOSIS — R00.2 PALPITATIONS: ICD-10-CM

## 2022-12-29 PROCEDURE — 99214 OFFICE O/P EST MOD 30 MIN: CPT | Performed by: INTERNAL MEDICINE

## 2022-12-29 RX ORDER — ERGOCALCIFEROL 1.25 MG/1
50000 CAPSULE ORAL
Qty: 12 CAPSULE | Refills: 0 | Status: SHIPPED | OUTPATIENT
Start: 2022-12-29

## 2022-12-29 RX ORDER — PROPRANOLOL HYDROCHLORIDE 10 MG/1
10 TABLET ORAL 3 TIMES DAILY PRN
Qty: 90 TABLET | Refills: 1 | Status: SHIPPED | OUTPATIENT
Start: 2022-12-29

## 2022-12-29 ASSESSMENT — ENCOUNTER SYMPTOMS
COUGH: 0
PALPITATIONS: 1
NERVOUS/ANXIOUS: 1
SHORTNESS OF BREATH: 0
FEVER: 0
CHILLS: 0

## 2022-12-29 ASSESSMENT — FIBROSIS 4 INDEX: FIB4 SCORE: 0.62

## 2022-12-29 NOTE — ASSESSMENT & PLAN NOTE
This is a chronic problem: thyroid, nutritional deficiencies and electrolyte abnormalities has been ruled out.  EKG obtained in clinic on 12/14/2022 was within normal limits.   -Obtain Zio patch and echocardiogram.   - Magnesium supplementation

## 2022-12-29 NOTE — PROGRESS NOTES
Subjective:     Diagnoses of Vitamin D deficiency and Palpitations were pertinent to this visit.    HPI: Mira is a pleasant 42 y.o. female who presents today to follow-up on the results of the blood work.    Problem   Palpitations    This is a chronic problem, daily symptoms described as heart fluttering inside the chest.  Patient exercises daily.  No associated chest pain, cough, shortness of breath.  She reports prior history of irregular heartbeats, which was evaluated with Holter monitor and no significant abnormalities were found at that time.  Patient was also told that she has a  heart murmur.    Blood work has been unremarkable: No nutritional deficiencies, thyroid abnormalities, anemia.     Patient is under a lot of stress currently due to her spouse being diagnosed with stage IV ovarian cancer.         Vitamin D Deficiency    Vitamin D level at 15.  Start high-dose vitamin D 50,000 units every 7 days for total of 12 weeks.       Past Medical History:   Diagnosis Date    Allergy     Anxiety     Arrhythmia     palpitations, irregular heart beat that patient was to follow up with    Asthma     denies inhaler need    Breath shortness     states chest feels heavy sometimes, sob with exertion    Depression     Gynecological disorder     Heart burn     Hx of cold sores     Infectious disease     cold sores    Migraine     Osteoporosis     Pain     abdominal pain    Psychiatric disorder     depression/anxiety     Current Outpatient Medications Ordered in Epic   Medication Sig Dispense Refill    vitamin D2, Ergocalciferol, (DRISDOL) 1.25 MG (03654 UT) Cap capsule Take 1 Capsule by mouth every 7 days. 12 Capsule 0    propranolol (INDERAL) 10 MG Tab Take 1 Tablet by mouth 3 times a day as needed (panic). 90 Tablet 1    acyclovir (ZOVIRAX) 800 MG Tab Take 800 mg by mouth every day.      ibuprofen (MOTRIN) 800 MG Tab Take 1 Tab by mouth every 8 hours as needed for Mild Pain ((1-3 Pain Scale) or fever over 101.5 or  "if allergic to acetaminophen). 30 Tab 3    acetaminophen (TYLENOL) 500 MG Tab Take 500-1,000 mg by mouth every 6 hours as needed.       No current Epic-ordered facility-administered medications on file.     Review of Systems   Constitutional:  Negative for chills and fever.   Respiratory:  Negative for cough and shortness of breath.    Cardiovascular:  Positive for palpitations. Negative for chest pain.   Psychiatric/Behavioral:  The patient is nervous/anxious.      Objective:     Exam:  /62 (BP Location: Left arm, Patient Position: Sitting, BP Cuff Size: Adult)   Pulse 65   Temp 36.6 °C (97.8 °F) (Temporal)   Ht 1.753 m (5' 9\")   Wt 92.8 kg (204 lb 11.2 oz)   SpO2 97%   BMI 30.23 kg/m²  Body mass index is 30.23 kg/m².    Physical Exam  Constitutional:       General: She is not in acute distress.     Appearance: She is not toxic-appearing.   HENT:      Head: Normocephalic and atraumatic.   Pulmonary:      Effort: No respiratory distress.   Neurological:      Mental Status: She is alert.     Labs: Reviewed and discussed results of the labs from 12/28/2022: TSH, free T4, CBC, CMP, lipid panel.     Assessment & Plan:   Mira  is a pleasant 42 y.o. female with the following -     Problem List Items Addressed This Visit       Palpitations (Chronic)     This is a chronic problem: thyroid, nutritional deficiencies and electrolyte abnormalities has been ruled out.  EKG obtained in clinic on 12/14/2022 was within normal limits.   -Obtain Zio patch and echocardiogram.   - Magnesium supplementation         Relevant Medications    propranolol (INDERAL) 10 MG Tab    Other Relevant Orders    [facility] ZIO PATCH MONITOR    EC-ECHOCARDIOGRAM COMPLETE W/O CONT    Vitamin D deficiency     Start vitamin D 50,000 units 7 days, switch to over-the-counter vitamin D3 4000 IU daily.         Relevant Medications    vitamin D2, Ergocalciferol, (DRISDOL) 1.25 MG (53542 UT) Cap capsule     Return in about 3 months (around 3/29/2023), " or if symptoms worsen or fail to improve, for echo and zio patch .    Please note that this dictation was created using voice recognition software. I have made every reasonable attempt to correct obvious errors, but I expect that there are errors of grammar and possibly content that I did not discover before finalizing the note.

## 2023-01-04 ENCOUNTER — TELEPHONE (OUTPATIENT)
Dept: HEALTH INFORMATION MANAGEMENT | Facility: OTHER | Age: 43
End: 2023-01-04
Payer: COMMERCIAL

## 2023-01-04 ENCOUNTER — NON-PROVIDER VISIT (OUTPATIENT)
Dept: CARDIOLOGY | Facility: MEDICAL CENTER | Age: 43
End: 2023-01-04
Attending: INTERNAL MEDICINE
Payer: COMMERCIAL

## 2023-01-04 DIAGNOSIS — I49.1 APC (ATRIAL PREMATURE CONTRACTIONS): ICD-10-CM

## 2023-01-04 DIAGNOSIS — I49.3 PVCS (PREMATURE VENTRICULAR CONTRACTIONS): ICD-10-CM

## 2023-01-04 DIAGNOSIS — I47.10 SVT (SUPRAVENTRICULAR TACHYCARDIA) (HCC): ICD-10-CM

## 2023-01-04 DIAGNOSIS — R00.2 PALPITATIONS: ICD-10-CM

## 2023-01-04 NOTE — PROGRESS NOTES
Home enrollment completed for the "[employer] XT Holter monitoring program  >Monitor mailed to patient by [employer].  >Currently pending EOS.

## 2023-01-31 ENCOUNTER — TELEPHONE (OUTPATIENT)
Dept: CARDIOLOGY | Facility: MEDICAL CENTER | Age: 43
End: 2023-01-31
Payer: COMMERCIAL

## 2023-02-02 PROCEDURE — 93248 EXT ECG>7D<15D REV&INTERPJ: CPT | Performed by: INTERNAL MEDICINE

## 2024-12-12 ENCOUNTER — APPOINTMENT (OUTPATIENT)
Dept: URGENT CARE | Facility: PHYSICIAN GROUP | Age: 44
End: 2024-12-12

## 2025-01-16 ENCOUNTER — OFFICE VISIT (OUTPATIENT)
Dept: URGENT CARE | Facility: PHYSICIAN GROUP | Age: 45
End: 2025-01-16
Payer: COMMERCIAL

## 2025-01-16 VITALS
WEIGHT: 214 LBS | SYSTOLIC BLOOD PRESSURE: 102 MMHG | OXYGEN SATURATION: 97 % | RESPIRATION RATE: 16 BRPM | HEIGHT: 66 IN | HEART RATE: 71 BPM | TEMPERATURE: 99 F | BODY MASS INDEX: 34.39 KG/M2 | DIASTOLIC BLOOD PRESSURE: 68 MMHG

## 2025-01-16 DIAGNOSIS — T36.95XA ANTIBIOTIC-INDUCED YEAST INFECTION: ICD-10-CM

## 2025-01-16 DIAGNOSIS — J01.40 ACUTE NON-RECURRENT PANSINUSITIS: ICD-10-CM

## 2025-01-16 DIAGNOSIS — B37.9 ANTIBIOTIC-INDUCED YEAST INFECTION: ICD-10-CM

## 2025-01-16 PROCEDURE — 99213 OFFICE O/P EST LOW 20 MIN: CPT | Performed by: NURSE PRACTITIONER

## 2025-01-16 PROCEDURE — 3074F SYST BP LT 130 MM HG: CPT | Performed by: NURSE PRACTITIONER

## 2025-01-16 PROCEDURE — 3078F DIAST BP <80 MM HG: CPT | Performed by: NURSE PRACTITIONER

## 2025-01-16 RX ORDER — FLUCONAZOLE 150 MG/1
TABLET ORAL
Qty: 2 TABLET | Refills: 0 | Status: SHIPPED | OUTPATIENT
Start: 2025-01-16

## 2025-01-16 RX ORDER — ONDANSETRON 4 MG/1
TABLET, ORALLY DISINTEGRATING ORAL
COMMUNITY
Start: 2024-12-12

## 2025-01-17 NOTE — PROGRESS NOTES
Chief Complaint   Patient presents with    Otalgia     Sinus pressure, ear pain right ear, headache x 2 months           History of Present Illness  The patient is a 44-year-old female who presents for evaluation of an ear infection.    She reports experiencing a sensation of fluid in her right ear, accompanied by tinnitus in both ears, which has been persisting for several months. She describes the tinnitus as particularly intense and painful, with even soft sounds perceived as excessively loud. She has attempted to alleviate the symptoms by yawning, but this has not provided relief. She also reports a sensation of pressure in her right ear, similar to the experience of being in an airplane. She has been unable to seek medical attention until now due to scheduling constraints. She does not report any systemic symptoms such as fever or body aches but does report fatigue. She admits to clenching and grinding her teeth and has two dental crowns in place. She has been managing her symptoms with ibuprofen, although she has not taken any today. She also took Sudafed when she was sick.  She had a viral illness several weeks ago.  Congestion and fullness in the right sinus and ear never fully improved.    She experienced a  migraine yesterday, localized over her right eye and associated with photophobia, necessitating rest in a darkened room. The migraine has since resolved.  She does have a history of migraines.  Denies worsening first.    She has a history of recurrent yeast infections.    ALLERGIES  The patient has no known allergies.    MEDICATIONS  Current: ibuprofen, Sudafed         ROS:    No severe shortness of breath   No Cardiac like chest pain, as discussed   As otherwise stated in HPI    Medical/SX/ Social History:  Reviewed per chart    Pertinent Medications:    Current Outpatient Medications on File Prior to Visit   Medication Sig Dispense Refill    propranolol (INDERAL) 10 MG Tab Take 1 Tablet by  mouth 3 times a day as needed (panic). 90 Tablet 1    acyclovir (ZOVIRAX) 800 MG Tab Take 800 mg by mouth every day.      ondansetron (ZOFRAN ODT) 4 MG TABLET DISPERSIBLE DISSOLVE 1 TABLET UNDER THE TONGUE EVERY 8 HOURS FOR NAUSEA OR VOMITING (Patient not taking: Reported on 1/16/2025)      vitamin D2, Ergocalciferol, (DRISDOL) 1.25 MG (94888 UT) Cap capsule Take 1 Capsule by mouth every 7 days. (Patient not taking: Reported on 1/16/2025) 12 Capsule 0    ibuprofen (MOTRIN) 800 MG Tab Take 1 Tab by mouth every 8 hours as needed for Mild Pain ((1-3 Pain Scale) or fever over 101.5 or if allergic to acetaminophen). (Patient not taking: Reported on 1/16/2025) 30 Tab 3    acetaminophen (TYLENOL) 500 MG Tab Take 500-1,000 mg by mouth every 6 hours as needed. (Patient not taking: Reported on 1/16/2025)       No current facility-administered medications on file prior to visit.        Allergies:    Patient has no known allergies.     Problem list, medications, and allergies reviewed by myself today in Epic     Physical Exam:    Vitals:    01/16/25 1557   BP: 102/68   Pulse: 71   Resp: 16   Temp: 37.2 °C (99 °F)   SpO2: 97%             Physical Exam  Vitals reviewed.   Constitutional:       General: She is not in acute distress.     Appearance: Normal appearance. She is well-developed. She is not toxic-appearing.   HENT:      Head: Normocephalic and atraumatic.      Right Ear: Ear canal and external ear normal. A middle ear effusion is present. Tympanic membrane is erythematous.      Left Ear: Ear canal and external ear normal. A middle ear effusion is present. Tympanic membrane is bulging.      Nose:      Right Turbinates: Swollen.      Right Sinus: Maxillary sinus tenderness and frontal sinus tenderness present.      Mouth/Throat:      Lips: Pink.      Mouth: Mucous membranes are moist.      Pharynx: Posterior oropharyngeal erythema present.      Tonsils: No tonsillar exudate.   Eyes:      General: Lids are normal. Gaze  aligned appropriately. No allergic shiner or scleral icterus.     Extraocular Movements: Extraocular movements intact.      Conjunctiva/sclera: Conjunctivae normal.      Pupils: Pupils are equal, round, and reactive to light.   Cardiovascular:      Rate and Rhythm: Normal rate and regular rhythm.      Pulses:           Radial pulses are 2+ on the right side and 2+ on the left side.      Heart sounds: Normal heart sounds.   Pulmonary:      Effort: Pulmonary effort is normal.      Breath sounds: Normal breath sounds. No wheezing.   Musculoskeletal:      Right lower leg: No edema.      Left lower leg: No edema.   Lymphadenopathy:      Cervical: No cervical adenopathy.   Skin:     General: Skin is warm.      Capillary Refill: Capillary refill takes less than 2 seconds.      Coloration: Skin is not cyanotic or pale.      Findings: No rash.   Neurological:      Mental Status: She is alert.      Gait: Gait is intact.   Psychiatric:         Behavior: Behavior normal. Behavior is cooperative.            Medical Decision making and plan :  I personally reviewed prior external notes and test results pertinent to today's visit. Pt is clinically stable at today's acute urgent care visit.  Patient appears nontoxic with no acute distress noted. Appropriate for outpatient care at this time.    Pleasant 44 y.o. female presented clinic with:     Assessment & Plan  1. Sinusitis.  Symptoms include a  right ear pain , ringing in both ears, and sinus pressure, especially on the right side. There is no congestion, cough, or nasal discharge. Examination revealed slight congestion in the nose and dull, pink eardrums with mucus behind them. She will be treated for a sinus infection with Augmentin. She is advised to continue using Sudafed and ibuprofen to help dry out the sinuses and reduce inflammation. Nasal rinses are recommended to help clear any congestion. If symptoms persist, she should follow up with her primary care physician.    2.  Otitis media.  The patient reports ear pain and ringing, which has been intense and painful. Examination showed dull, pink eardrums with mucus behind them. She will be treated for an ear infection with Augmentin. She is advised to continue using Sudafed and ibuprofen to help dry out the sinuses and reduce inflammation. Nasal rinses are recommended to help clear any congestion. If symptoms persist, she should follow up with her primary care physician.    3. Migraine.  The patient experienced a severe migraine yesterday, characterized by light sensitivity and pain over the right eye. She has a history of migraines but has not had one recently. She is advised to continue using ibuprofen for pain management. If migraines persist, further evaluation may be necessary.    4. Bruxism.  The patient reports clenching and grinding her teeth, which may be exacerbated by the sinus and ear infections. She is advised to consider a dental consultation to rule out any potential dental infections that could be contributing to her symptoms.    5. Recurrent yeast infections.  A prescription for Diflucan 150 mg, consisting of 2 tablets, will be sent to [facility].      Shared decision-making was utilized with patient for treatment plan. Medication discussed included indication for use and the potential benefits and side effects. Education was provided regarding the aforementioned assessments.  Differential Diagnosis, natural history, and supportive care discussed. All of the patient's questions were answered to their satisfaction at the time of discharge. Patient was encouraged to monitor symptoms closely. Those signs and symptoms which would warrant concern and mandate seeking a higher level of service through the emergency department discussed at length.  Patient stated agreement and understanding of this plan of care.    Disposition:  Home in stable condition     Voice Recognition Disclaimer:  Portions of this document were created  using voice recognition software and [employer] technology provided by [employer]. The software does have a chance of producing errors of grammar and possibly content. I have made every reasonable attempt to correct obvious errors, but there may be errors of grammar and possibly content that I did not discover before finalizing the  documentation.    DELIA Marino.

## 2025-02-26 ENCOUNTER — APPOINTMENT (OUTPATIENT)
Dept: MEDICAL GROUP | Age: 45
End: 2025-02-26

## 2025-02-26 VITALS
DIASTOLIC BLOOD PRESSURE: 78 MMHG | SYSTOLIC BLOOD PRESSURE: 122 MMHG | WEIGHT: 210 LBS | RESPIRATION RATE: 16 BRPM | HEART RATE: 82 BPM | OXYGEN SATURATION: 97 % | TEMPERATURE: 98.3 F | BODY MASS INDEX: 31.83 KG/M2 | HEIGHT: 68 IN

## 2025-02-26 DIAGNOSIS — R53.83 OTHER FATIGUE: ICD-10-CM

## 2025-02-26 DIAGNOSIS — H93.13 TINNITUS, BILATERAL: ICD-10-CM

## 2025-02-26 DIAGNOSIS — H91.93 BILATERAL HEARING LOSS, UNSPECIFIED HEARING LOSS TYPE: ICD-10-CM

## 2025-02-26 DIAGNOSIS — R73.03 PREDIABETES: ICD-10-CM

## 2025-02-26 DIAGNOSIS — H65.93 MIDDLE EAR EFFUSION, BILATERAL: ICD-10-CM

## 2025-02-26 DIAGNOSIS — M26.643 ARTHRITIS OF BOTH TEMPOROMANDIBULAR JOINTS: ICD-10-CM

## 2025-02-26 DIAGNOSIS — E78.5 DYSLIPIDEMIA (HIGH LDL; LOW HDL): ICD-10-CM

## 2025-02-26 DIAGNOSIS — E55.9 VITAMIN D DEFICIENCY: ICD-10-CM

## 2025-02-26 DIAGNOSIS — R00.2 PALPITATIONS: ICD-10-CM

## 2025-02-26 DIAGNOSIS — B00.9 HSV (HERPES SIMPLEX VIRUS) INFECTION: ICD-10-CM

## 2025-02-26 PROBLEM — H91.90 HEARING LOSS: Status: ACTIVE | Noted: 2025-02-26

## 2025-02-26 RX ORDER — ACYCLOVIR 800 MG/1
800 TABLET ORAL
Qty: 90 TABLET | Refills: 1 | Status: SHIPPED | OUTPATIENT
Start: 2025-02-26

## 2025-02-26 RX ORDER — PROPRANOLOL HYDROCHLORIDE 10 MG/1
10 TABLET ORAL 3 TIMES DAILY PRN
Qty: 90 TABLET | Refills: 1 | Status: SHIPPED | OUTPATIENT
Start: 2025-02-26

## 2025-02-26 RX ORDER — ERGOCALCIFEROL 1.25 MG/1
50000 CAPSULE, LIQUID FILLED ORAL
Qty: 12 CAPSULE | Refills: 3 | Status: SHIPPED | OUTPATIENT
Start: 2025-02-26

## 2025-02-26 RX ORDER — FLUTICASONE PROPIONATE 50 MCG
1 SPRAY, SUSPENSION (ML) NASAL DAILY
Qty: 16 G | Refills: 3 | Status: SHIPPED | OUTPATIENT
Start: 2025-02-26

## 2025-02-26 NOTE — PROGRESS NOTES
CC:   Chief Complaint   Patient presents with    Ringing in Ear     Bilaterally, very predominant in left ear. Would like a referral for an ENT    Medication Refill     Acyclovir, vitamin D, propanolol      Diagnoses of Tinnitus, bilateral, Arthritis of both temporomandibular joints, HSV (herpes simplex virus) infection, Palpitations, Vitamin D deficiency, Dyslipidemia (high LDL; low HDL), Other fatigue, and Prediabetes were pertinent to this visit.  Verbal consent was acquired by the patient to use [employer] ambient listening note generation during this visit Yes     History of Present Illness  Mira is a pleasant 45 y.o. female with a past medical history of prediabetes, HSV, and vitamin D deficiency, presenting for ringing in both ears, predominantly in the left ear.    She has been experiencing bilateral tinnitus, predominantly in the left ear, for approximately 6 months. The ringing in her left ear is particularly loud, causing distraction and irritation. She describes the tinnitus as constant. She reports a history of mild tinnitus, which she attributes to age-related changes and exposure to loud music. Recently, she experienced a period of illness lasting 5 months, during which she contracted norovirus following a series of colds.    She reports no earache but acknowledges a change in her hearing, describing it as louder. She also reports persistent eye swelling and congestion, which temporarily improve with massage. She expresses concern about the possibility of permanent tinnitus. Certain head positions and jaw movements can temporarily alleviate the symptoms. She avoids using earbuds in the affected ear and keeps the volume low when using them in the other ear. She is not currently using any nasal sprays. She sought treatment at an urgent care facility, where she was prescribed antibiotics.    She experiences soreness in the area around her ears and suspects that she grinds her teeth during sleep and  periods of stress. Her dentist has recommended a mouthguard, but she finds it cost-prohibitive.    She is on acyclovir for herpes and takes it as needed.     She assumes she is extremely vitamin D deficient considering that she works in her house and feels sick. She does not feel depressed but is exhausted no matter how much sleep she gets. Her eyes are tired all the time, which is normal for her. She does go outside and has a huge window, so she is not shut up inside. She is wondering if this is just post-COVID-19 reality, this feeling of exhaustion and brain fog.    Past Medical History:   Diagnosis Date    Allergy     Anxiety     Arrhythmia     palpitations, irregular heart beat that patient was to follow up with    Asthma     denies inhaler need    Breath shortness     states chest feels heavy sometimes, sob with exertion    Depression     Gynecological disorder     Heart burn     Hx of cold sores     Infectious disease     cold sores    Migraine     Osteoporosis     Pain     abdominal pain    Psychiatric disorder     depression/anxiety       Current Outpatient Medications Ordered in Epic   Medication Sig Dispense Refill    acyclovir (ZOVIRAX) 800 MG Tab Take 1 Tablet by mouth every day. 90 Tablet 1    propranolol (INDERAL) 10 MG Tab Take 1 Tablet by mouth 3 times a day as needed (panic). 90 Tablet 1    vitamin D2, Ergocalciferol, (DRISDOL) 1.25 MG (11038 UT) Cap capsule Take 1 Capsule by mouth every 7 days. 12 Capsule 3    ondansetron (ZOFRAN ODT) 4 MG TABLET DISPERSIBLE       acetaminophen (TYLENOL) 500 MG Tab Take 500-1,000 mg by mouth every 6 hours as needed. (Patient not taking: Reported on 2/26/2025)       No current [facility]-ordered facility-administered medications on file.     Review of Systems   HENT:  Positive for hearing loss and tinnitus.      Objective:     Exam:  /78 (BP Location: Right arm, Patient Position: Sitting, BP Cuff Size: Large adult)   Pulse 82   Temp 36.8 °C (98.3 °F) (Temporal)    "Resp 16   Ht 1.727 m (5' 8\")   Wt 95.3 kg (210 lb)   LMP 01/28/2025 (Exact Date)   SpO2 97%   Breastfeeding No   BMI 31.93 kg/m²  Body mass index is 31.93 kg/m².    Physical Exam  Constitutional:       Appearance: Normal appearance.   HENT:      Head: Normocephalic and atraumatic.      Right Ear: A middle ear effusion is present.      Left Ear: A middle ear effusion is present.      Nose: Congestion present.      Mouth/Throat:      Mouth: Mucous membranes are moist.      Pharynx: Oropharynx is clear. No oropharyngeal exudate or posterior oropharyngeal erythema.   Eyes:      Conjunctiva/sclera: Conjunctivae normal.   Pulmonary:      Effort: Pulmonary effort is normal. No respiratory distress.      Breath sounds: Normal breath sounds.   Neurological:      Mental Status: She is alert.   Psychiatric:         Mood and Affect: Mood normal.         Behavior: Behavior normal.         Thought Content: Thought content normal.         Judgment: Judgment normal.       Assessment & Plan:   Mira  is a pleasant 45 y.o. female with the following -     Assessment & Plan  1. Bilateral tinnitus.  The patient reports ringing in both ears, predominantly in the left ear, which started approximately 6 months ago. She describes the ringing as constant and sometimes accompanied by a pulsing sound. The ringing can be temporarily alleviated by tilting her head or rubbing her jaw. She also reports increased sensitivity to sound and a feeling of congestion in her sinuses. A referral to an ENT specialist and an audiologist will be made for further evaluation.  Start Flonase twice daily.    2. Temporomandibular joint (TMJ) dysfunction.  The patient likely has TMJ dysfunction, which may be contributing to her tinnitus. She reports grinding her teeth, especially when stressed or sleeping, and experiences soreness around her ears. A referral to a physical therapist who specializes in TMJ will be made. She is advised to consider using a " mouthguard, although she finds it expensive.    3. Hearing loss.  The patient reports that her hearing feels affected, describing it as louder rather than diminished. A referral to an audiologist will be made to have her hearing tested.    Problem List Items Addressed This Visit       Arthritis of both temporomandibular joints    Relevant Orders    Referral to Physical Therapy    Dyslipidemia (high LDL; low HDL)    Relevant Orders    Lipid Profile    HSV (herpes simplex virus) infection    Relevant Medications    acyclovir (ZOVIRAX) 800 MG Tab    Other fatigue    Relevant Orders    CBC WITH DIFFERENTIAL    Comp Metabolic Panel    TSH WITH REFLEX TO FT4    Palpitations (Chronic)    Relevant Medications    propranolol (INDERAL) 10 MG Tab    Prediabetes    Relevant Orders    HEMOGLOBIN A1C    Tinnitus, bilateral    Relevant Orders    Referral to ENT    Referral to Audiology    Vitamin D deficiency    Relevant Medications    vitamin D2, Ergocalciferol, (DRISDOL) 1.25 MG (75521 UT) Cap capsule    Other Relevant Orders    VITAMIN D,25 HYDROXY (DEFICIENCY)       No follow-ups on file. AWV 4/15/2025     Please note that this dictation was created using voice recognition software. I have made every reasonable attempt to correct obvious errors, but I expect that there are errors of grammar and possibly content that I did not discover before finalizing the note.

## 2025-02-27 NOTE — Clinical Note
REFERRAL APPROVAL NOTICE         Sent on February 27, 2025                   Mira Diaz  [address] [place] [address]                   Dear Ms. Diaz,    After a careful review of the medical information and benefit coverage, [facility] has processed your referral. See below for additional details.    If applicable, you must be actively enrolled with your insurance for coverage of the authorized service. If you have any questions regarding your coverage, please contact your insurance directly.    REFERRAL INFORMATION   Referral #:  [id number]  Referred-To Provider    Referred-By Provider:  Audiologist    Judy Young M.D.   [facility]      [address] [place] [address]  [phone] [address] [address]  [phone]    Referral Start Date:  02/26/2025  Referral End Date:   02/26/2026             SCHEDULING  If you do not already have an appointment, please call [phone] to make an appointment.     MORE INFORMATION  If you do not already have a [employer] account, sign up at: [email or web address]  You can access your medical information, make appointments, see lab results, billing information, and more.  If you have questions regarding this referral, please contact  the [facility] Referrals department at:             [phone]. Monday - Friday 8:00AM - 5:00PM.     Sincerely,    [facility]

## 2025-02-27 NOTE — Clinical Note
REFERRAL APPROVAL NOTICE         Sent on February 27, 2025                   Mira Diaz  [address] [place] [address]                   Dear Ms. Diaz,    After a careful review of the medical information and benefit coverage, [facility] has processed your referral. See below for additional details.    If applicable, you must be actively enrolled with your insurance for coverage of the authorized service. If you have any questions regarding your coverage, please contact your insurance directly.    REFERRAL INFORMATION   Referral #:  [id number]  Referred-To Provider    Referred-By Provider:  Physical Therapy    GARETH Javier SABRINA M [address] [place] [address]  [phone] [address] [address]  [phone]    Referral Start Date:  02/26/2025  Referral End Date:   02/26/2026             SCHEDULING  If you do not already have an appointment, please call [phone] to make an appointment.     MORE INFORMATION  If you do not already have a "[employer] account, sign up at: [email or web address]  You can access your medical information, make appointments, see lab results, billing information, and more.  If you have questions regarding this referral, please contact  the [facility] Referrals department at:             [phone]. Monday - Friday 8:00AM - 5:00PM.     Sincerely,    [facility]

## 2025-02-27 NOTE — Clinical Note
REFERRAL APPROVAL NOTICE         Sent on February 27, 2025                   Mira Diaz  [address] [place] [address]                   Dear Ms. Diaz,    After a careful review of the medical information and benefit coverage, [facility] has processed your referral. See below for additional details.    If applicable, you must be actively enrolled with your insurance for coverage of the authorized service. If you have any questions regarding your coverage, please contact your insurance directly.    REFERRAL INFORMATION   Referral #:  [id number]  Referred-To Provider    Referred-By Provider:  Otolaryngology    Judy Young M.D.   [facility]      [address] [place] [address]  [phone] [address] [place] [address]  [phone]    Referral Start Date:  02/26/2025  Referral End Date:   02/26/2026             SCHEDULING  If you do not already have an appointment, please call [phone] to make an appointment.     MORE INFORMATION  If you do not already have a [facility] account, sign up at: [email or web address]  You can access your medical information, make appointments, see lab results, billing information, and more.  If you have questions regarding this referral, please contact  the [facility] Referrals department at:             [phone]. Monday - Friday 8:00AM - 5:00PM.     Sincerely,    [facility]

## 2025-04-14 ENCOUNTER — HOSPITAL ENCOUNTER (OUTPATIENT)
Dept: LAB | Facility: MEDICAL CENTER | Age: 45
End: 2025-04-14
Attending: INTERNAL MEDICINE
Payer: COMMERCIAL

## 2025-04-14 DIAGNOSIS — R73.03 PREDIABETES: ICD-10-CM

## 2025-04-14 DIAGNOSIS — R53.83 OTHER FATIGUE: ICD-10-CM

## 2025-04-14 DIAGNOSIS — E55.9 VITAMIN D DEFICIENCY: ICD-10-CM

## 2025-04-14 DIAGNOSIS — E78.5 DYSLIPIDEMIA (HIGH LDL; LOW HDL): ICD-10-CM

## 2025-04-14 LAB
25(OH)D3 SERPL-MCNC: 26 NG/ML (ref 30–100)
ALBUMIN SERPL BCP-MCNC: 4.2 G/DL (ref 3.2–4.9)
ALBUMIN/GLOB SERPL: 1.6 G/DL
ALP SERPL-CCNC: 63 U/L (ref 30–99)
ALT SERPL-CCNC: 11 U/L (ref 2–50)
ANION GAP SERPL CALC-SCNC: 12 MMOL/L (ref 7–16)
AST SERPL-CCNC: 19 U/L (ref 12–45)
BASOPHILS # BLD AUTO: 0.5 % (ref 0–1.8)
BASOPHILS # BLD: 0.03 K/UL (ref 0–0.12)
BILIRUB SERPL-MCNC: 0.6 MG/DL (ref 0.1–1.5)
BUN SERPL-MCNC: 8 MG/DL (ref 8–22)
CALCIUM ALBUM COR SERPL-MCNC: 8.9 MG/DL (ref 8.5–10.5)
CALCIUM SERPL-MCNC: 9.1 MG/DL (ref 8.5–10.5)
CHLORIDE SERPL-SCNC: 107 MMOL/L (ref 96–112)
CHOLEST SERPL-MCNC: 165 MG/DL (ref 100–199)
CO2 SERPL-SCNC: 21 MMOL/L (ref 20–33)
CREAT SERPL-MCNC: 0.72 MG/DL (ref 0.5–1.4)
EOSINOPHIL # BLD AUTO: 0.15 K/UL (ref 0–0.51)
EOSINOPHIL NFR BLD: 2.5 % (ref 0–6.9)
ERYTHROCYTE [DISTWIDTH] IN BLOOD BY AUTOMATED COUNT: 43.2 FL (ref 35.9–50)
EST. AVERAGE GLUCOSE BLD GHB EST-MCNC: 128 MG/DL
GFR SERPLBLD CREATININE-BSD FMLA CKD-EPI: 106 ML/MIN/1.73 M 2
GLOBULIN SER CALC-MCNC: 2.6 G/DL (ref 1.9–3.5)
GLUCOSE SERPL-MCNC: 90 MG/DL (ref 65–99)
HBA1C MFR BLD: 6.1 % (ref 4–5.6)
HCT VFR BLD AUTO: 42 % (ref 37–47)
HDLC SERPL-MCNC: 39 MG/DL
HGB BLD-MCNC: 14.1 G/DL (ref 12–16)
IMM GRANULOCYTES # BLD AUTO: 0.01 K/UL (ref 0–0.11)
IMM GRANULOCYTES NFR BLD AUTO: 0.2 % (ref 0–0.9)
LDLC SERPL CALC-MCNC: 103 MG/DL
LYMPHOCYTES # BLD AUTO: 1.21 K/UL (ref 1–4.8)
LYMPHOCYTES NFR BLD: 20.4 % (ref 22–41)
MCH RBC QN AUTO: 31 PG (ref 27–33)
MCHC RBC AUTO-ENTMCNC: 33.6 G/DL (ref 32.2–35.5)
MCV RBC AUTO: 92.3 FL (ref 81.4–97.8)
MONOCYTES # BLD AUTO: 0.98 K/UL (ref 0–0.85)
MONOCYTES NFR BLD AUTO: 16.5 % (ref 0–13.4)
NEUTROPHILS # BLD AUTO: 3.56 K/UL (ref 1.82–7.42)
NEUTROPHILS NFR BLD: 59.9 % (ref 44–72)
NRBC # BLD AUTO: 0 K/UL
NRBC BLD-RTO: 0 /100 WBC (ref 0–0.2)
PLATELET # BLD AUTO: 210 K/UL (ref 164–446)
PMV BLD AUTO: 12.4 FL (ref 9–12.9)
POTASSIUM SERPL-SCNC: 4 MMOL/L (ref 3.6–5.5)
PROT SERPL-MCNC: 6.8 G/DL (ref 6–8.2)
RBC # BLD AUTO: 4.55 M/UL (ref 4.2–5.4)
SODIUM SERPL-SCNC: 140 MMOL/L (ref 135–145)
TRIGL SERPL-MCNC: 115 MG/DL (ref 0–149)
TSH SERPL DL<=0.005 MIU/L-ACNC: 2.15 UIU/ML (ref 0.38–5.33)
WBC # BLD AUTO: 5.9 K/UL (ref 4.8–10.8)

## 2025-04-14 PROCEDURE — 80053 COMPREHEN METABOLIC PANEL: CPT

## 2025-04-14 PROCEDURE — 85025 COMPLETE CBC W/AUTO DIFF WBC: CPT

## 2025-04-14 PROCEDURE — 80061 LIPID PANEL: CPT

## 2025-04-14 PROCEDURE — 84443 ASSAY THYROID STIM HORMONE: CPT

## 2025-04-14 PROCEDURE — 36415 COLL VENOUS BLD VENIPUNCTURE: CPT

## 2025-04-14 PROCEDURE — 83036 HEMOGLOBIN GLYCOSYLATED A1C: CPT

## 2025-04-14 PROCEDURE — 82306 VITAMIN D 25 HYDROXY: CPT

## 2025-04-15 ENCOUNTER — APPOINTMENT (OUTPATIENT)
Dept: MEDICAL GROUP | Age: 45
End: 2025-04-15
Payer: COMMERCIAL

## 2025-04-15 ENCOUNTER — RESULTS FOLLOW-UP (OUTPATIENT)
Dept: MEDICAL GROUP | Age: 45
End: 2025-04-15

## 2025-04-16 ENCOUNTER — OFFICE VISIT (OUTPATIENT)
Dept: MEDICAL GROUP | Age: 45
End: 2025-04-16
Payer: COMMERCIAL

## 2025-04-16 VITALS
SYSTOLIC BLOOD PRESSURE: 110 MMHG | BODY MASS INDEX: 31.83 KG/M2 | HEIGHT: 68 IN | HEART RATE: 70 BPM | DIASTOLIC BLOOD PRESSURE: 68 MMHG | WEIGHT: 210 LBS | TEMPERATURE: 97.9 F | RESPIRATION RATE: 16 BRPM | OXYGEN SATURATION: 98 %

## 2025-04-16 DIAGNOSIS — Z00.00 ANNUAL PHYSICAL EXAM: ICD-10-CM

## 2025-04-16 DIAGNOSIS — Z87.19 HISTORY OF DIVERTICULITIS: ICD-10-CM

## 2025-04-16 DIAGNOSIS — E78.5 DYSLIPIDEMIA (HIGH LDL; LOW HDL): ICD-10-CM

## 2025-04-16 DIAGNOSIS — L65.9 HAIR LOSS: ICD-10-CM

## 2025-04-16 DIAGNOSIS — Z12.4 CERVICAL CANCER SCREENING: ICD-10-CM

## 2025-04-16 DIAGNOSIS — E78.5 DYSLIPIDEMIA: ICD-10-CM

## 2025-04-16 DIAGNOSIS — E55.9 VITAMIN D DEFICIENCY: ICD-10-CM

## 2025-04-16 DIAGNOSIS — R73.03 PREDIABETES: ICD-10-CM

## 2025-04-16 DIAGNOSIS — R19.7 DIARRHEA, UNSPECIFIED TYPE: ICD-10-CM

## 2025-04-16 PROCEDURE — 3078F DIAST BP <80 MM HG: CPT | Performed by: INTERNAL MEDICINE

## 2025-04-16 PROCEDURE — 99214 OFFICE O/P EST MOD 30 MIN: CPT | Mod: 25 | Performed by: INTERNAL MEDICINE

## 2025-04-16 PROCEDURE — 3074F SYST BP LT 130 MM HG: CPT | Performed by: INTERNAL MEDICINE

## 2025-04-16 PROCEDURE — 99396 PREV VISIT EST AGE 40-64: CPT | Performed by: INTERNAL MEDICINE

## 2025-04-16 RX ORDER — SEMAGLUTIDE 0.25 MG/.5ML
0.25 INJECTION, SOLUTION SUBCUTANEOUS
Qty: 2 ML | Refills: 0 | Status: SHIPPED | OUTPATIENT
Start: 2025-04-16

## 2025-04-16 ASSESSMENT — PATIENT HEALTH QUESTIONNAIRE - PHQ9: CLINICAL INTERPRETATION OF PHQ2 SCORE: 0

## 2025-04-16 ASSESSMENT — FIBROSIS 4 INDEX: FIB4 SCORE: 1.17

## 2025-04-16 NOTE — PROGRESS NOTES
Subjective:     CC:   Chief Complaint   Patient presents with    Annual Exam     Verbal consent was acquired by the patient to use [employer] ambient listening note generation during this visit Yes   History of Present Illness  Mira is a pleasant 49-year-old female with a history of prediabetes, hearing loss, and vitamin D deficiency, presenting to discuss the results of blood tests and for annual preventative visit.     Severe allergies were experienced last week, now manifesting as a cough. No pain during swallowing is reported, but a persistent dry sinus cough is noted. Symptoms seem to worsen post-COVID-19, and no allergist has been consulted. Daily use of Flonase has significantly reduced sinus issues and improved mood and premenstrual symptoms.      A history of TMJ is present, with continued tightness in the area, although improved with Flonase. Teeth grinding at night is admitted, and a mouthguard has been advised but found uncomfortable.    Hair loss has been occurring for approximately 6 months, with a reduction in hair volume by about one-third and thinning of individual strands. Despite maintaining good hair hygiene, the cause of hair loss is unidentified. Vitamin D supplements have been taken for the past 7 weeks.    Fatigue and brain fog are reported, with vitamin D supplements taken for the past 7 weeks.    Irregular menstrual cycles have been occurring for the past 6 months, with periods either late in the month or early in the next month. Cramping is currently experienced, anticipating the period to start on 04/19/2025. A history of regular menstrual cycles is noted.    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 Consideration of Wegovy for weight loss is mentioned.    Gastrointestinal issues include chronic diarrhea, with bowel movements occurring 4 to 5 times a day. A history of diverticulitis, diagnosed around 2010 or 2012, is noted, with no recent  flare-ups. A colonoscopy was advised following the diverticulitis diagnosis but has not yet been done. A history of a ruptured fallopian tube required several surgeries and resulted in scar tissue formation.    Elevated blood sugar levels have been experienced throughout medical history, despite not consuming excessive amounts of sugar and maintaining adequate hydration.      Health Maintenance  Aspirin Use:  n/a   Diet:  discussed   Exercise: started Yoga 4 days   Substance Abuse: none   Safe in relationship.   Seat belts, bike helmet, gun safety discussed.  Sun protection used.    Cancer screening  Lung Cancer Screening: n/a   Cervical Cancer Screenin  Breast Cancer Screening: pending     Infectious disease screening/Immunizations  --STI Screening: deferred   --Practices safe sex.  --HIV Screening: deferred   --Hepatitis C Screening: deferred   --Immunizations:    Influenza: yearly    HPV:     Tetanus: UTD per pt    Shingles: n/a    Pneumococcal   COVID-19: x3    Hepatitis B:  UTD     She  has a past medical history of Allergy, Anxiety, Arrhythmia, Asthma, Breath shortness, Depression, Gynecological disorder, Heart burn, cold sores, Infectious disease, Migraine, Osteoporosis, Pain, and Psychiatric disorder.  She  has a past surgical history that includes laparoscopy (2015); tubal ligation; appendectomy; tonsillectomy (); pelviscopy (2/3/2018); and myomectomy (2/3/2018).    Family History   Problem Relation Age of Onset    Heart Disease Father          from heart attack, 65    Cancer Maternal Grandmother         Ling cancer    Cancer Maternal Grandfather         Brain cancer    Heart Disease Paternal Grandmother          of MI    Cancer Paternal Grandfather         Pancreatic    No Known Problems Son     Diabetes Neg Hx        Social History     Socioeconomic History    Marital status:      Spouse name: Not on file    Number of children: Not on file    Years of education: Not on file     Highest education level: Associate degree: academic program   Occupational History    Not on file   Tobacco Use    Smoking status: Former     Current packs/day: 0.00     Types: Cigarettes     Quit date: 2005     Years since quittin.2    Smokeless tobacco: Never    Tobacco comments:     Social smoker never   Vaping Use    Vaping status: Never Used   Substance and Sexual Activity    Alcohol use: Yes     Alcohol/week: 2.4 oz     Types: 1 Glasses of wine, 1 Cans of beer, 1 Shots of liquor, 1 Standard drinks or equivalent per week     Comment: I dont drink regularly, havent for about two mo    Drug use: Yes     Frequency: 1.0 times per week     Types: Oral     Comment: marijuana, last use 48 hours ago    Sexual activity: Yes     Partners: Female     Birth control/protection: None     Comment: .    Other Topics Concern    Not on file   Social History Narrative    Not on file     Social Drivers of Health     Financial Resource Strain: Medium Risk (2022)    Overall Financial Resource Strain (CARDIA)     Difficulty of Paying Living Expenses: Somewhat hard   Food Insecurity: Food Insecurity Present (2022)    Hunger Vital Sign     Worried About Running Out of Food in the Last Year: Sometimes true     Ran Out of Food in the Last Year: Sometimes true   Transportation Needs: No Transportation Needs (2022)    PRAPARE - Transportation     Lack of Transportation (Medical): No     Lack of Transportation (Non-Medical): No   Physical Activity: Insufficiently Active (2022)    Exercise Vital Sign     Days of Exercise per Week: 1 day     Minutes of Exercise per Session: 30 min   Stress: Stress Concern Present (2022)    [employer] - Occupational Stress Questionnaire     Feeling of Stress : To some extent   Social Connections: Moderately Isolated (2022)    Social Connection and Isolation Panel [NHANES]     Frequency of Communication with Friends and Family:  Once a week     Frequency of Social Gatherings with Friends and Family: More than three times a week     Attends Congregational Services: Never     Active Member of Clubs or Organizations: No     Attends Club or Organization Meetings: Never     Marital Status:    Intimate Partner Violence: Not on file   Housing Stability: Low Risk  (12/11/2022)    Housing Stability Vital Sign     Unable to Pay for Housing in the Last Year: No     Number of Places Lived in the Last Year: 1     Unstable Housing in the Last Year: No       Patient Active Problem List    Diagnosis Date Noted    Tinnitus, bilateral 02/26/2025    Arthritis of both temporomandibular joints 02/26/2025    Hearing loss 02/26/2025    Hair loss 12/14/2022    Palpitations 12/14/2022    Other fatigue 12/14/2022    Prediabetes 12/14/2022    Need for vaccination 12/14/2022    Symptoms consistent with irritable bowel syndrome 06/11/2021    Diverticulosis 06/11/2021    Vitamin D deficiency 06/11/2021    Dyslipidemia (high LDL; low HDL) 06/11/2021    Overweight (BMI 25.0-29.9) 02/18/2021    Acute pain of left knee 02/18/2021    HSV (herpes simplex virus) infection 02/18/2021    Pelvic pain in female 02/03/2018    Dysmenorrhea 02/03/2018    Serosal uterine leiomyoma 02/03/2018         Current Outpatient Medications   Medication Sig Dispense Refill    acyclovir (ZOVIRAX) 800 MG Tab Take 1 Tablet by mouth every day. 90 Tablet 1    propranolol (INDERAL) 10 MG Tab Take 1 Tablet by mouth 3 times a day as needed (panic). 90 Tablet 1    vitamin D2, Ergocalciferol, (DRISDOL) 1.25 MG (99978 UT) Cap capsule Take 1 Capsule by mouth every 7 days. 12 Capsule 3    fluticasone (FLONASE ALLERGY RELIEF) 50 MCG/ACT nasal spray Administer 1 Spray into affected nostril(S) every day. 16 g 3    ondansetron (ZOFRAN ODT) 4 MG TABLET DISPERSIBLE       acetaminophen (TYLENOL) 500 MG Tab Take 500-1,000 mg by mouth every 6 hours as needed. (Patient not taking: Reported on 1/16/2025)       No  "current facility-administered medications for this visit.     No Known Allergies    Review of Systems   Constitutional: Negative for fever, chills   HENT: Positive for congestion.    Eyes: Negative for pain.    Respiratory: Negative for shortness of breath.  Cardiovascular: Negative for leg swelling.   Gastrointestinal: Negative for nausea, vomiting, abdominal pain and diarrhea.   Genitourinary: Negative for dysuria and hematuria.   Skin: Negative for rash.   Neurological: Negative for dizziness, focal weakness and headaches.   Endo/Heme/Allergies: Does not bleed easily.   Psychiatric/Behavioral: Negative for depression.  The patient is not nervous/anxious.      Objective:     /68 (BP Location: Right arm, Patient Position: Sitting, BP Cuff Size: Large adult)   Pulse 70   Temp 36.6 °C (97.9 °F) (Temporal)   Resp 16   Ht 1.72 m (5' 7.72\")   Wt 95.3 kg (210 lb)   LMP 03/19/2025 (Approximate)   SpO2 98%   Breastfeeding No   BMI 32.20 kg/m²   Body mass index is 32.2 kg/m².  Wt Readings from Last 4 Encounters:   04/16/25 95.3 kg (210 lb)   02/26/25 95.3 kg (210 lb)   01/16/25 97.1 kg (214 lb)   12/29/22 92.8 kg (204 lb 11.2 oz)     Physical Exam  Constitutional:       General: She is not in acute distress.     Appearance: Normal appearance. She is obese. She is not toxic-appearing.   HENT:      Head: Normocephalic and atraumatic.      Nose: Congestion present.      Mouth/Throat:      Mouth: Mucous membranes are moist.      Pharynx: Oropharynx is clear.   Eyes:      Conjunctiva/sclera: Conjunctivae normal.   Cardiovascular:      Rate and Rhythm: Normal rate and regular rhythm.      Pulses: Normal pulses.      Heart sounds: Normal heart sounds. No murmur heard.  Pulmonary:      Effort: Pulmonary effort is normal. No respiratory distress.      Breath sounds: Normal breath sounds.   Abdominal:      Palpations: Abdomen is soft.      Tenderness: There is abdominal tenderness.   Musculoskeletal:      Right lower " leg: No edema.      Left lower leg: No edema.   Skin:     General: Skin is warm.   Neurological:      Mental Status: She is alert and oriented to person, place, and time.   Psychiatric:         Mood and Affect: Mood normal.         Thought Content: Thought content normal.         Judgment: Judgment normal.       Assessment and Plan:     Assessment & Plan  1. Health Maintenance.  - [facility] for [facility]necological care.  - Advised to schedule a mammogram  - Record release request will be sent to obtain Pap smear results.  - Last Pap smear was in 07/2024.    2. Allergic Rhinitis.  - Reports significant improvement in sinus symptoms with daily use of Flonase spray.  - Advised to continue using Flonase daily unless experiencing nosebleeds or dryness.  - No dryness or nosebleeds reported.  - Noted improvement in mood and premenstrual symptoms with Flonase use.    3. Temporomandibular Joint Disorder (TMJ).  - Experiences tightness in the jaw, improved with Flonase use.  - Advised to consider using an over-the-counter mouthguard to manage nighttime teeth grinding.  - Reports grinding teeth at night.  - Discussed the use of over-the-counter mouthguards.    4. Hair Loss.  - Reports significant hair loss over the past 6 months.  - Hair loss may be attributed to low vitamin D levels.  - Advised to continue vitamin D supplementation for another 3 months.  - Iron and vitamin B12 levels will be checked during the next blood draw.      5. Fatigue.  - Reports persistent fatigue and brain fog.  - Fatigue may be related to low vitamin D levels.  - Advised to continue vitamin D supplementation for another 3 months.  - Iron and vitamin B12 levels will be checked during the next blood draw.    6. Irregular Menstrual Cycles.  - Reports irregular menstrual cycles over the past 6 months.  - [facility] will be made for further evaluation.  - Reports cramping and irregular timing of  periods.  - Previously regular menstrual cycles.    7. Obesity    -Current BMI 32.2  - Considering the use of Wegovy for weight loss.   -Tried and failed: Low calorie diet, regular exercise.   - Prescription for Wegovy will be submitted to insurance.  - Informed about potential side effects, including nausea and GI discomfort.  - Discussed eligibility for Wegovy based on weight and comorbidities.     8. Chronic Diarrhea.  - Reports chronic diarrhea and abdominal pain.  - Referral to gastroenterology will be made for further evaluation.  - Reports diarrhea 4-5 times a day.  - History of diverticulitis and previous episodes of colitis.    9. History of Diverticulitis.  - History of diverticulitis, no recent flare-ups.  - Referral to gastroenterology for further evaluation and potential colonoscopy.  - Last episode of diverticulitis was around 2012.  - Discussed the need for a colonoscopy.    10. Prediabetes.  - A1c levels have increased from last year.  - Advised to incorporate more cardiovascular exercises, such as walking for 30 minutes daily or 15 minutes after meals.  - Follow-up blood test will be scheduled in 3 months to monitor A1c levels.  - Discussed dietary modifications and lifestyle changes.    11. Dyslipidemia.  - Cholesterol levels have improved but are not yet within the normal range.  - Advised to continue current diet and exercise regimen.  - Follow-up blood test will be scheduled in 3 months to monitor cholesterol levels.  - Reports dietary efforts to manage cholesterol.    12. Vitamin D Deficiency.  - Vitamin D levels remain low despite supplementation.  - Advised to continue weekly vitamin D supplementation for another 3 months.  - Iron and vitamin B12 levels will be checked during the next blood draw.  - Discussed taking vitamin D with fatty foods for better absorption.    HCM:  as above    Labs per orders  Immunizations per orders  Patient counseled about skin care, diet, supplements, prenatal  vitamins, safe sex and exercise.    Follow-up:  3 mo LABS WEIGHT     Please note that this dictation was created using voice recognition software. I have made every reasonable attempt to correct obvious errors, but I expect that there are errors of grammar and possibly content that I did not discover before finalizing the note.

## 2025-04-16 NOTE — LETTER
[facility]  Judy Young M.D.  [address] [place] [address]  Fax: [phone]   Authorization for Release/Disclosure of   Protected Health Information   Name: MIRA DIAZ : 1980 SSN: [id number]   Address: [address] [place] [address] [address] [place] [address] Phone:    [phone] (home)    I authorize the entity listed below to release/disclose the PHI below to:   [facility]/Judy Young M.D. and Judy Young M.D.   Provider or Entity Name:  Dr. Daugherty    Address   City, State, [facility]   Phone:      Fax:     Reason for request: continuity of care   Information to be released:    [  ] LAST COLONOSCOPY,  including any PATH REPORT and follow-up  [  ] LAST FIT/COLOGUARD RESULT [  ] LAST DEXA  [  ] LAST MAMMOGRAM  [ x ] LAST PAP  [  ] LAST LABS [  ] RETINA EXAM REPORT  [  ] IMMUNIZATION RECORDS  [  ] Release all info      [  ] Check here and initial the line next to each item to release ALL health information INCLUDING  _____ Care and treatment for drug and / or alcohol abuse  _____ HIV testing, infection status, or AIDS  _____ Genetic Testing    DATES OF SERVICE OR TIME PERIOD TO BE DISCLOSED: _____________  I understand and acknowledge that:  * This Authorization may be revoked at any time by you in writing, except if your health information has already been used or disclosed.  * Your health information that will be used or disclosed as a result of you signing this authorization could be re-disclosed by the recipient. If this occurs, your re-disclosed health information may no longer be protected by State or Federal laws.  * You may refuse to sign this Authorization. Your refusal will not affect your ability to obtain treatment.  * This Authorization becomes effective upon signing and will  on (date) __________.      If no date is indicated, this Authorization will  one (1) year from the signature date.    Name: Mira Diaz  Signature: Date:    4/16/2025     PLEASE FAX REQUESTED RECORDS BACK TO: [phone]

## 2025-04-22 NOTE — Clinical Note
REFERRAL APPROVAL NOTICE         Sent on April 22, 2025                   Mira Diaz  [address] [place] [address]                   Dear Ms. Diaz,    After a careful review of the medical information and benefit coverage, [facility] has processed your referral. See below for additional details.    If applicable, you must be actively enrolled with your insurance for coverage of the authorized service. If you have any questions regarding your coverage, please contact your insurance directly.    REFERRAL INFORMATION   Referral #:  [id number]  Referred-To Provider    Referred-By Provider:  Gastroenterology    Judy Young M.D.   [facility]      [address] [place] [address] [address] [place] [id number] [phone] [address] [facility]  [place] [address]  [phone]    Referral Start Date:  04/16/2025  Referral End Date:   04/16/2026             SCHEDULING  If you do not already have an appointment, please call [phone] to make an appointment.     MORE INFORMATION  If you do not already have a [employer] account, sign up at: [email or web address]  You can access your medical information, make appointments, see lab results, billing information, and more.  If you have questions regarding this referral, please contact  the [facility] Referrals department at:             [phone]. Monday - Friday 8:00AM - 5:00PM.     Sincerely,    [facility]

## 2025-04-22 NOTE — Clinical Note
REFERRAL APPROVAL NOTICE         Sent on April 22, 2025                   Mira Diaz  [address] [place] [address]                   Dear Ms. Diaz,    After a careful review of the medical information and benefit coverage, [facility] has processed your referral. See below for additional details.    If applicable, you must be actively enrolled with your insurance for coverage of the authorized service. If you have any questions regarding your coverage, please contact your insurance directly.    REFERRAL INFORMATION   Referral #:  [id number]  Referred-To Provider    Referred-By Provider:  OB & Gyn - Gynecology    Judy Young M.D.   [facility]      [address] [place] [address]  [phone] [address]  [place] [address]  [phone]    Referral Start Date:  04/16/2025  Referral End Date:   04/16/2026             SCHEDULING  If you do not already have an appointment, please call [phone] to make an appointment.     MORE INFORMATION  If you do not already have a [employer] account, sign up at: [email or web address]  You can access your medical information, make appointments, see lab results, billing information, and more.  If you have questions regarding this referral, please contact  the [facility] Referrals department at:             [phone]. Monday - Friday 8:00AM - 5:00PM.     Sincerely,    [facility]

## 2025-07-28 ENCOUNTER — HOSPITAL ENCOUNTER (OUTPATIENT)
Dept: LAB | Facility: MEDICAL CENTER | Age: 45
End: 2025-07-28
Attending: INTERNAL MEDICINE
Payer: COMMERCIAL

## 2025-07-28 DIAGNOSIS — E78.5 DYSLIPIDEMIA: ICD-10-CM

## 2025-07-28 DIAGNOSIS — L65.9 HAIR LOSS: ICD-10-CM

## 2025-07-28 DIAGNOSIS — E55.9 VITAMIN D DEFICIENCY: ICD-10-CM

## 2025-07-28 DIAGNOSIS — R73.03 PREDIABETES: ICD-10-CM

## 2025-07-28 LAB
25(OH)D3 SERPL-MCNC: 31 NG/ML (ref 30–100)
CHOLEST SERPL-MCNC: 191 MG/DL (ref 100–199)
EST. AVERAGE GLUCOSE BLD GHB EST-MCNC: 111 MG/DL
FERRITIN SERPL-MCNC: 40.1 NG/ML (ref 10–291)
HBA1C MFR BLD: 5.5 % (ref 4–5.6)
HDLC SERPL-MCNC: 44 MG/DL
IRON SATN MFR SERPL: 27 % (ref 15–55)
IRON SERPL-MCNC: 52 UG/DL (ref 40–170)
LDLC SERPL CALC-MCNC: 118 MG/DL
TIBC SERPL-MCNC: 194 UG/DL (ref 250–450)
TRIGL SERPL-MCNC: 147 MG/DL (ref 0–149)
UIBC SERPL-MCNC: 142 UG/DL (ref 110–370)
VIT B12 SERPL-MCNC: 336 PG/ML (ref 211–911)

## 2025-07-28 PROCEDURE — 82306 VITAMIN D 25 HYDROXY: CPT

## 2025-07-28 PROCEDURE — 82607 VITAMIN B-12: CPT

## 2025-07-28 PROCEDURE — 82728 ASSAY OF FERRITIN: CPT

## 2025-07-28 PROCEDURE — 80061 LIPID PANEL: CPT

## 2025-07-28 PROCEDURE — 83550 IRON BINDING TEST: CPT

## 2025-07-28 PROCEDURE — 83036 HEMOGLOBIN GLYCOSYLATED A1C: CPT

## 2025-07-28 PROCEDURE — 83540 ASSAY OF IRON: CPT

## 2025-07-28 PROCEDURE — 36415 COLL VENOUS BLD VENIPUNCTURE: CPT

## 2025-07-29 ENCOUNTER — RESULTS FOLLOW-UP (OUTPATIENT)
Dept: MEDICAL GROUP | Age: 45
End: 2025-07-29
Payer: COMMERCIAL

## 2025-07-30 ENCOUNTER — OFFICE VISIT (OUTPATIENT)
Dept: MEDICAL GROUP | Age: 45
End: 2025-07-30
Payer: COMMERCIAL

## 2025-07-30 VITALS
OXYGEN SATURATION: 96 % | WEIGHT: 207 LBS | BODY MASS INDEX: 31.37 KG/M2 | RESPIRATION RATE: 16 BRPM | TEMPERATURE: 98.4 F | HEART RATE: 68 BPM | HEIGHT: 68 IN | DIASTOLIC BLOOD PRESSURE: 68 MMHG | SYSTOLIC BLOOD PRESSURE: 106 MMHG

## 2025-07-30 DIAGNOSIS — R06.83 SNORING: Primary | ICD-10-CM

## 2025-07-30 DIAGNOSIS — E53.8 VITAMIN B12 DEFICIENCY: ICD-10-CM

## 2025-07-30 DIAGNOSIS — D50.9 IRON DEFICIENCY ANEMIA, UNSPECIFIED IRON DEFICIENCY ANEMIA TYPE: ICD-10-CM

## 2025-07-30 DIAGNOSIS — R73.03 PREDIABETES: ICD-10-CM

## 2025-07-30 DIAGNOSIS — Z91.89 AT RISK FOR OBSTRUCTIVE SLEEP APNEA: ICD-10-CM

## 2025-07-30 DIAGNOSIS — R11.0 NAUSEA: ICD-10-CM

## 2025-07-30 DIAGNOSIS — Z12.31 ENCOUNTER FOR SCREENING MAMMOGRAM FOR BREAST CANCER: ICD-10-CM

## 2025-07-30 RX ORDER — MULTIVITAMIN WITH IRON
500 TABLET ORAL DAILY
COMMUNITY

## 2025-07-30 RX ORDER — ONDANSETRON 4 MG/1
4 TABLET, ORALLY DISINTEGRATING ORAL EVERY 8 HOURS PRN
Qty: 10 TABLET | Refills: 3 | Status: SHIPPED | OUTPATIENT
Start: 2025-07-30

## 2025-07-30 ASSESSMENT — FIBROSIS 4 INDEX: FIB4 SCORE: 1.17

## 2025-07-30 NOTE — PROGRESS NOTES
CC:   Chief Complaint   Patient presents with    Lab Results    Medication Refill     Zofran,      The primary encounter diagnosis was Snoring. Diagnoses of Encounter for screening mammogram for breast cancer, Vitamin B12 deficiency, Iron deficiency anemia, unspecified iron deficiency anemia type, Prediabetes, Nausea, and At risk for obstructive sleep apnea were also pertinent to this visit.  Verbal consent was acquired by the patient to use [employer] ambient listening note generation during this visit Yes     History of Present Illness  Mira is a pleasant 45 y.o. female with a history of prediabetes, vitamin D deficiency, and obesity, presenting for follow-up.    She reports that her A1c levels have decreased. She has been using Wegovy to manage her A1c and aid in weight loss. She is hesitant to start metformin due to concerns about potential side effects. She maintains a high-protein diet with plenty of green vegetables and minimal sugar intake. She drinks a lot of water. She has previously tried Weight Watchers and Noom but found them too time-consuming. She does not consume sugar during the day and only drinks coffee in the morning. She believes her current health issues are due to aging, decreased exercise, and hormonal changes associated with menopause or perimenopause. She recalls having irregular periods as a child but has been regular for most of her adult life until recently. [facility] and needs to get a mammogram done. She has been experiencing constant menstrual cycles and suspects she may be entering menopause or perimenopause.    Her weight has remained stable at 207 pounds for some time. She believes increased physical activity might help her lose weight.    She spends a lot of time outdoors in her garden and takes weekly vitamin D supplements. Despite this, she still feels fatigued.    She snores at night but reports poor sleep  "quality. She attributes this to anxiety and frequent awakenings at night. She does not believe she has sleep apnea as no one has observed her stop breathing during sleep.    She has noticed a scar on her skin and is considering whether she should consult a dermatologist.    Diet: High-protein diet with green vegetables, minimal sugar intake, drinks a lot of water  Coffee/Tea/Caffeine-containing Drinks: Drinks coffee in the morning  Sleep: Reports poor sleep quality, frequent awakenings, and anxiety affecting sleep    GYNECOLOGICAL HISTORY:  - Frequency and Flow: Constant menstrual cycles, twice a month    Past Medical History[1]    Current Medications and Prescriptions Ordered in Epic[2]    Review of Systems   Constitutional:  Positive for malaise/fatigue.   All other systems reviewed and are negative.    Objective:     Exam:  /68 (BP Location: Right arm, Patient Position: Sitting, BP Cuff Size: Large adult)   Pulse 68   Temp 36.9 °C (98.4 °F) (Temporal)   Resp 16   Ht 1.72 m (5' 7.72\")   Wt 93.9 kg (207 lb)   SpO2 96%   BMI 31.74 kg/m²  Body mass index is 31.74 kg/m².    Physical Exam  Constitutional:       Appearance: Normal appearance.   HENT:      Head: Normocephalic and atraumatic.   Cardiovascular:      Pulses: Normal pulses.   Pulmonary:      Effort: Pulmonary effort is normal. No respiratory distress.   Neurological:      Mental Status: She is alert and oriented to person, place, and time.   Psychiatric:         Mood and Affect: Mood normal.         Behavior: Behavior normal.         Thought Content: Thought content normal.         Judgment: Judgment normal.       Results: I have independently reviewed and interpreted results    Latest Reference Range & Units 07/28/25 10:39   Iron 40 - 170 ug/dL 52   Total Iron Binding 250 - 450 ug/dL 194 (L)   % Saturation 15 - 55 % 27   Unsat Iron Binding 110 - 370 ug/dL 142   Glycohemoglobin 4.0 - 5.6 % 5.5   Estim. Avg Glu mg/dL 111   Cholesterol,Tot 100 - " 199 mg/dL 191   Triglycerides 0 - 149 mg/dL 147   HDL >=40 mg/dL 44   LDL <100 mg/dL 118 (H)   25-Hydroxy   Vitamin D 25 30 - 100 ng/mL 31   Ferritin 10.0 - 291.0 ng/mL 40.1   Vitamin B12 -True Cobalamin 211 - 911 pg/mL 336   (L): Data is abnormally low  (H): Data is abnormally high  Results      Assessment & Plan:   Mira  is a pleasant 45 y.o. female with the following -     Assessment & Plan  1. Prediabetes.  - A1c level has improved to 5.5.  - Current regimen is effective; focus on increasing physical activity.  - Discussed potential benefits of metformin for weight loss; patient prefers to avoid due to side effects.  - Continue monitoring A1c levels.    2. Vitamin D deficiency.  - Vitamin D levels are improving.  - Currently taking weekly vitamin D supplement.  - Spending time outside in the sun.  - Continue current supplementation and sun exposure.    3. Obesity.  - Advised to increase physical activity.  - Consider seeing a dietitian, though patient is concerned about insurance coverage.   - Discussed potential benefits of metformin for weight loss; patient prefers to avoid due to side effects.  - Continue monitoring weight and dietary habits.    4. Fatigue.  - Reports feeling exhausted despite spending time outside and taking vitamin D supplements.  - Sleep study will be ordered to rule out sleep apnea.  - Discussed potential impact of anxiety and disrupted sleep due to family responsibilities.    5. Vitamin B12 deficiency.  - Vitamin B12 levels are low.  - Advised to start over-the-counter vitamin B12 supplements, either 500 mcg daily or 1000 mcg every other day.  - Iron levels are low   - Advised to start over-the-counter iron glycinate supplements, which are gentle on the stomach.    6. Dermatofibroma.  - Benign-looking dermatofibroma noted.  - Referral to a dermatologist was offered  for further evaluation.  - Monitor for any changes or symptoms.    Problem List Items Addressed This Visit        Prediabetes    Relevant Orders    HEMOGLOBIN A1C     Other Visit Diagnoses         Snoring    -  Primary    Relevant Orders    Overnight Home Sleep Study      Encounter for screening mammogram for breast cancer        Relevant Orders    MA-SCREENING MAMMO BILAT W/TOMOSYNTHESIS W/CAD      Vitamin B12 deficiency        Relevant Orders    VITAMIN B12      Iron deficiency anemia, unspecified iron deficiency anemia type        Relevant Orders    IRON/TOTAL IRON BIND    FERRITIN      Nausea        Relevant Medications    ondansetron (ZOFRAN ODT) 4 MG TABLET DISPERSIBLE      At risk for obstructive sleep apnea        STOPBANG - Sleep Apnea Screening      Flowsheet Row Most Recent Value   S - Have you been told that you SNORE? Yes   T - Are you often TIRED during the day? Yes   O - Do you know if you stop breathing or has anyone witnessed you stop breathing while you were asleep? (OBSTRUCTION) No   P - Do you have high blood PRESSURE or on medication to control high blood pressure? No   B - Is your Body Mass Index greater than 35? (BMI) No   A - Are you 50 years old or older? (AGE) No   N - Are you a male with a NECK circumference greater than 17 inches, or a female with a neck circumference greater than 16 inches? Yes   G - Are you male? (GENDER) No   STOPBANG Total Score 3   RANJEET Risk Intermediate Risk         Relevant Orders    Overnight Home Sleep Study            Follow-up: The patient will follow up in 4 months LABS     Please note that this dictation was created using voice recognition software. I have made every reasonable attempt to correct obvious errors, but I expect that there are errors of grammar and possibly content that I did not discover before finalizing the note.             [1]   Past Medical History:  Diagnosis Date    Allergy     Anxiety     Arrhythmia     palpitations, irregular heart beat that patient was to follow up with    Asthma     denies inhaler need    Breath shortness     states chest feels  heavy sometimes, sob with exertion    Depression     Gynecological disorder     Heart burn     Hx of cold sores     Infectious disease     cold sores    Migraine     Osteoporosis     Pain     abdominal pain    Psychiatric disorder     depression/anxiety   [2]   Current Outpatient Medications Ordered in Epic   Medication Sig Dispense Refill    cyanocobalamin (VITAMIN B-12) 500 MCG Tab Take 500 mcg by mouth every day.      ondansetron (ZOFRAN ODT) 4 MG TABLET DISPERSIBLE Take 1 Tablet by mouth every 8 hours as needed for Nausea/Vomiting. 10 Tablet 3    acyclovir (ZOVIRAX) 800 MG Tab Take 1 Tablet by mouth every day. 90 Tablet 1    propranolol (INDERAL) 10 MG Tab Take 1 Tablet by mouth 3 times a day as needed (panic). 90 Tablet 1    vitamin D2, Ergocalciferol, (DRISDOL) 1.25 MG (38259 UT) Cap capsule Take 1 Capsule by mouth every 7 days. 12 Capsule 3    fluticasone (FLONASE ALLERGY RELIEF) 50 MCG/ACT nasal spray Administer 1 Spray into affected nostril(S) every day. 16 g 3    Semaglutide,0.25 or 0.5MG/DOS, (OZEMPIC, 0.25 OR 0.5 MG/DOSE,) 2 MG/3ML Solution Pen-injector Inject 0.25 mg under the skin every 7 days. (Patient not taking: Reported on 7/30/2025) 3 mL 11    Semaglutide (WEGOVY) 0.25 MG/0.5ML Solution Auto-injector Pen-injector Inject 0.5 mL under the skin every 7 days. (Patient not taking: Reported on 7/30/2025) 2 mL 0    acetaminophen (TYLENOL) 500 MG Tab Take 500-1,000 mg by mouth every 6 hours as needed. (Patient not taking: Reported on 7/30/2025)       No current [facility]-ordered facility-administered medications on file.

## 2025-07-31 NOTE — Clinical Note
REFERRAL APPROVAL NOTICE         Sent on July 31, 2025                   Mira Diaz  [address] [place] [address]                   Dear Ms. Diaz,    After a careful review of the medical information and benefit coverage, [facility] has processed your referral. See below for additional details.    If applicable, you must be actively enrolled with your insurance for coverage of the authorized service. If you have any questions regarding your coverage, please contact your insurance directly.    REFERRAL INFORMATION   Referral #:  [id number]  Referred-To Department    Referred-By Provider:  Pulmonary and Sleep Medicine    Judy Young M.D.   [facility]      [address] [place] [address]  [phone] [address] [facility] [address]  [facility] [place] [id number] [phone]    Referral Start Date:  07/30/2025  Referral End Date:   07/30/2026             SCHEDULING  If you do not already have an appointment, please call [phone] to make an appointment.     MORE INFORMATION  If you do not already have a [facility] account, sign up at: [email or web address]  You can access your medical information, make appointments, see lab results, billing information, and more.  If you have questions regarding this referral, please contact  the [facility] Referrals department at:             [phone]. Monday - Friday 8:00AM - 5:00PM.     Sincerely,    [facility]

## (undated) DEVICE — SUTURE 4-0 MONOCRYL PLUS PS-2 - 27 INCH (36/BX)

## (undated) DEVICE — SYRINGE DISP. 12 CC LL - (100/BX)

## (undated) DEVICE — TROCAR STEP 5MM - (3/CA)

## (undated) DEVICE — SLEEVE, VASO, THIGH, MED

## (undated) DEVICE — UTERINE MANIP RUMI 6.7X6 - (5/BX)

## (undated) DEVICE — LACTATED RINGERS INJ 1000 ML - (14EA/CA 60CA/PF)

## (undated) DEVICE — SODIUM CHL IRRIGATION 0.9% 1000ML (12EA/CA)

## (undated) DEVICE — SUTURE GENERAL

## (undated) DEVICE — HEAD HOLDER JUNIOR/ADULT

## (undated) DEVICE — TUBING CLEARLINK DUO-VENT - C-FLO (48EA/CA)

## (undated) DEVICE — PENCIL ELECTSURG 10FT BTN SWH - (50/CA)

## (undated) DEVICE — STAPLER SKIN DISP - (6/BX 10BX/CA) VISISTAT

## (undated) DEVICE — PROTECTOR ULNA NERVE - (36PR/CA)

## (undated) DEVICE — PACK LAPAROSCOPY - (1/CA)

## (undated) DEVICE — Device

## (undated) DEVICE — SUTURE 2-0 VICRYL PLUS CT-1 36 (36PK/BX)"

## (undated) DEVICE — SENSOR SPO2 NEO LNCS ADHESIVE (20/BX) SEE USER NOTES

## (undated) DEVICE — TROCAR STEP 11MM - (3/CA)

## (undated) DEVICE — GLOVE BIOGEL SZ 6.5 SURGICAL PF LTX (50PR/BX 4BX/CA)

## (undated) DEVICE — GOWN WARMING STANDARD FLEX - (30/CA)

## (undated) DEVICE — TOWELS CLOTH SURGICAL - (4/PK 20PK/CA)

## (undated) DEVICE — TROCAR SEPARATOR 15MMZTHREAD - (6/BX)

## (undated) DEVICE — SUCTION INSTRUMENT YANKAUER BULBOUS TIP W/O VENT (50EA/CA)

## (undated) DEVICE — KIT ROOM DECONTAMINATION

## (undated) DEVICE — KIT ANESTHESIA W/CIRCUIT & 3/LT BAG W/FILTER (20EA/CA)

## (undated) DEVICE — CANISTER SUCTION 3000ML MECHANICAL FILTER AUTO SHUTOFF MEDI-VAC NONSTERILE LF DISP  (40EA/CA)

## (undated) DEVICE — DERMABOND ADVANCED - (12EA/BX)

## (undated) DEVICE — BAG RETRIEVAL 10ML (10EA/BX)

## (undated) DEVICE — SET SUCTION/IRRIGATION WITH DISPOSABLE TIP (6/CA )PART #0250-070-520 IS A SUB

## (undated) DEVICE — NEPTUNE 4 PORT MANIFOLD - (20/PK)

## (undated) DEVICE — MASK ANESTHESIA ADULT  - (100/CA)

## (undated) DEVICE — SEALER VESSEL HARMONIC ACE PLUS WITH ADVANCED HEMOSTASIS 36CM (1/EA)

## (undated) DEVICE — GLOVE BIOGEL ECLIPSE PF LATEX SIZE 7.5

## (undated) DEVICE — SET EXTENSION WITH 2 PORTS (48EA/CA) ***PART #2C8610 IS A SUBSTITUTE*****

## (undated) DEVICE — SET LEADWIRE 5 LEAD BEDSIDE DISPOSABLE ECG (1SET OF 5/EA)

## (undated) DEVICE — NEEDLE INSUFFLATION FOR STEP - (12/BX)

## (undated) DEVICE — ELECTRODE 5MM LHK LAPSCP STERILE DISP- MEGADYNE  (5/CA)

## (undated) DEVICE — TRAY SRGPRP PVP IOD WT PRP - (20/CA)

## (undated) DEVICE — ELECTRODE 850 FOAM ADHESIVE - HYDROGEL RADIOTRNSPRNT (50/PK)

## (undated) DEVICE — GLOVE BIOGEL SZ 7.5 SURGICAL PF LTX - (50PR/BX 4BX/CA)